# Patient Record
Sex: FEMALE | Race: BLACK OR AFRICAN AMERICAN | Employment: UNEMPLOYED | URBAN - METROPOLITAN AREA
[De-identification: names, ages, dates, MRNs, and addresses within clinical notes are randomized per-mention and may not be internally consistent; named-entity substitution may affect disease eponyms.]

---

## 2017-06-19 LAB
ANTIBODY SCREEN, EXTERNAL: NEGATIVE
HBSAG, EXTERNAL: NEGATIVE
HIV, EXTERNAL: NEGATIVE
RPR, EXTERNAL: NON REACTIVE
RUBELLA, EXTERNAL: NORMAL
TYPE, ABO & RH, EXTERNAL: NORMAL

## 2017-12-11 LAB — GRBS, EXTERNAL: POSITIVE

## 2017-12-26 ENCOUNTER — HOSPITAL ENCOUNTER (INPATIENT)
Age: 27
LOS: 2 days | Discharge: HOME | DRG: 560 | End: 2017-12-28
Attending: OBSTETRICS & GYNECOLOGY | Admitting: OBSTETRICS & GYNECOLOGY
Payer: MEDICAID

## 2017-12-26 ENCOUNTER — ANESTHESIA (OUTPATIENT)
Dept: LABOR AND DELIVERY | Age: 27
DRG: 560 | End: 2017-12-26
Payer: MEDICAID

## 2017-12-26 ENCOUNTER — ANESTHESIA EVENT (OUTPATIENT)
Dept: LABOR AND DELIVERY | Age: 27
DRG: 560 | End: 2017-12-26
Payer: MEDICAID

## 2017-12-26 DIAGNOSIS — Z37.9 NORMAL LABOR: Primary | ICD-10-CM

## 2017-12-26 LAB
BASOPHILS # BLD: 0 K/UL (ref 0–0.1)
BASOPHILS NFR BLD: 0 % (ref 0–1)
DIFFERENTIAL METHOD BLD: ABNORMAL
EOSINOPHIL # BLD: 0.1 K/UL (ref 0–0.4)
EOSINOPHIL NFR BLD: 1 % (ref 0–7)
ERYTHROCYTE [DISTWIDTH] IN BLOOD BY AUTOMATED COUNT: 14.3 % (ref 11.5–14.5)
HCT VFR BLD AUTO: 34.9 % (ref 35–47)
HGB BLD-MCNC: 11.5 G/DL (ref 11.5–16)
LYMPHOCYTES # BLD: 1.2 K/UL (ref 0.8–3.5)
LYMPHOCYTES NFR BLD: 15 % (ref 12–49)
MCH RBC QN AUTO: 28.8 PG (ref 26–34)
MCHC RBC AUTO-ENTMCNC: 33 G/DL (ref 30–36.5)
MCV RBC AUTO: 87.3 FL (ref 80–99)
MONOCYTES # BLD: 0.7 K/UL (ref 0–1)
MONOCYTES NFR BLD: 8 % (ref 5–13)
NEUTS SEG # BLD: 6.3 K/UL (ref 1.8–8)
NEUTS SEG NFR BLD: 76 % (ref 32–75)
PLATELET # BLD AUTO: 304 K/UL (ref 150–400)
RBC # BLD AUTO: 4 M/UL (ref 3.8–5.2)
RBC MORPH BLD: ABNORMAL
WBC # BLD AUTO: 8.3 K/UL (ref 3.6–11)
WBC MORPH BLD: ABNORMAL

## 2017-12-26 PROCEDURE — 0HQ9XZZ REPAIR PERINEUM SKIN, EXTERNAL APPROACH: ICD-10-PCS | Performed by: OBSTETRICS & GYNECOLOGY

## 2017-12-26 PROCEDURE — 65410000002 HC RM PRIVATE OB

## 2017-12-26 PROCEDURE — 85025 COMPLETE CBC W/AUTO DIFF WBC: CPT | Performed by: OBSTETRICS & GYNECOLOGY

## 2017-12-26 PROCEDURE — 74011000250 HC RX REV CODE- 250: Performed by: ANESTHESIOLOGY

## 2017-12-26 PROCEDURE — 77030007880 HC KT SPN EPDRL BBMI -B

## 2017-12-26 PROCEDURE — 74011250636 HC RX REV CODE- 250/636: Performed by: OBSTETRICS & GYNECOLOGY

## 2017-12-26 PROCEDURE — 10907ZC DRAINAGE OF AMNIOTIC FLUID, THERAPEUTIC FROM PRODUCTS OF CONCEPTION, VIA NATURAL OR ARTIFICIAL OPENING: ICD-10-PCS | Performed by: OBSTETRICS & GYNECOLOGY

## 2017-12-26 PROCEDURE — 76060000078 HC EPIDURAL ANESTHESIA

## 2017-12-26 PROCEDURE — 77030031139 HC SUT VCRL2 J&J -A

## 2017-12-26 PROCEDURE — 74011000258 HC RX REV CODE- 258: Performed by: OBSTETRICS & GYNECOLOGY

## 2017-12-26 PROCEDURE — 00HU33Z INSERTION OF INFUSION DEVICE INTO SPINAL CANAL, PERCUTANEOUS APPROACH: ICD-10-PCS | Performed by: ANESTHESIOLOGY

## 2017-12-26 PROCEDURE — 77030014125 HC TY EPDRL BBMI -B: Performed by: ANESTHESIOLOGY

## 2017-12-26 PROCEDURE — 74011000250 HC RX REV CODE- 250

## 2017-12-26 PROCEDURE — 36415 COLL VENOUS BLD VENIPUNCTURE: CPT | Performed by: OBSTETRICS & GYNECOLOGY

## 2017-12-26 PROCEDURE — 75410000002 HC LABOR FEE PER 1 HR

## 2017-12-26 PROCEDURE — 74011250636 HC RX REV CODE- 250/636: Performed by: ANESTHESIOLOGY

## 2017-12-26 PROCEDURE — 74011250636 HC RX REV CODE- 250/636

## 2017-12-26 PROCEDURE — 75410000003 HC RECOV DEL/VAG/CSECN EA 0.5 HR

## 2017-12-26 PROCEDURE — A4300 CATH IMPL VASC ACCESS PORTAL: HCPCS

## 2017-12-26 PROCEDURE — 75410000000 HC DELIVERY VAGINAL/SINGLE

## 2017-12-26 RX ORDER — NALOXONE HYDROCHLORIDE 0.4 MG/ML
0.4 INJECTION, SOLUTION INTRAMUSCULAR; INTRAVENOUS; SUBCUTANEOUS AS NEEDED
Status: DISCONTINUED | OUTPATIENT
Start: 2017-12-26 | End: 2017-12-26 | Stop reason: HOSPADM

## 2017-12-26 RX ORDER — LIDOCAINE HYDROCHLORIDE 10 MG/ML
10 INJECTION INFILTRATION; PERINEURAL ONCE
Status: DISCONTINUED | OUTPATIENT
Start: 2017-12-26 | End: 2017-12-26 | Stop reason: HOSPADM

## 2017-12-26 RX ORDER — BUPIVACAINE HYDROCHLORIDE 5 MG/ML
30 INJECTION, SOLUTION EPIDURAL; INTRACAUDAL AS NEEDED
Status: DISCONTINUED | OUTPATIENT
Start: 2017-12-26 | End: 2017-12-26 | Stop reason: HOSPADM

## 2017-12-26 RX ORDER — FENTANYL CITRATE 50 UG/ML
100 INJECTION, SOLUTION INTRAMUSCULAR; INTRAVENOUS ONCE
Status: COMPLETED | OUTPATIENT
Start: 2017-12-26 | End: 2017-12-26

## 2017-12-26 RX ORDER — SODIUM CHLORIDE 0.9 % (FLUSH) 0.9 %
5-10 SYRINGE (ML) INJECTION AS NEEDED
Status: DISCONTINUED | OUTPATIENT
Start: 2017-12-26 | End: 2017-12-26 | Stop reason: HOSPADM

## 2017-12-26 RX ORDER — SODIUM CHLORIDE 900 MG/100ML
INJECTION INTRAVENOUS
Status: DISCONTINUED
Start: 2017-12-26 | End: 2017-12-27

## 2017-12-26 RX ORDER — BUPIVACAINE HYDROCHLORIDE 2.5 MG/ML
INJECTION, SOLUTION EPIDURAL; INFILTRATION; INTRACAUDAL AS NEEDED
Status: DISCONTINUED | OUTPATIENT
Start: 2017-12-26 | End: 2017-12-26 | Stop reason: HOSPADM

## 2017-12-26 RX ORDER — FENTANYL/BUPIVACAINE/NS/PF 2-1250MCG
1-16 PREFILLED PUMP RESERVOIR EPIDURAL CONTINUOUS
Status: DISCONTINUED | OUTPATIENT
Start: 2017-12-26 | End: 2017-12-27

## 2017-12-26 RX ORDER — ONDANSETRON 2 MG/ML
4 INJECTION INTRAMUSCULAR; INTRAVENOUS
Status: DISCONTINUED | OUTPATIENT
Start: 2017-12-26 | End: 2017-12-26 | Stop reason: HOSPADM

## 2017-12-26 RX ORDER — AMPICILLIN 2 G/1
INJECTION, POWDER, FOR SOLUTION INTRAVENOUS
Status: COMPLETED
Start: 2017-12-26 | End: 2017-12-26

## 2017-12-26 RX ORDER — LIDOCAINE HYDROCHLORIDE AND EPINEPHRINE 15; 5 MG/ML; UG/ML
4.5 INJECTION, SOLUTION EPIDURAL AS NEEDED
Status: DISCONTINUED | OUTPATIENT
Start: 2017-12-26 | End: 2017-12-26 | Stop reason: HOSPADM

## 2017-12-26 RX ORDER — EPHEDRINE SULFATE 50 MG/ML
INJECTION, SOLUTION INTRAVENOUS
Status: DISCONTINUED
Start: 2017-12-26 | End: 2017-12-27

## 2017-12-26 RX ORDER — SODIUM CHLORIDE 0.9 % (FLUSH) 0.9 %
5-10 SYRINGE (ML) INJECTION EVERY 8 HOURS
Status: DISCONTINUED | OUTPATIENT
Start: 2017-12-26 | End: 2017-12-26 | Stop reason: HOSPADM

## 2017-12-26 RX ORDER — SODIUM CHLORIDE, SODIUM LACTATE, POTASSIUM CHLORIDE, CALCIUM CHLORIDE 600; 310; 30; 20 MG/100ML; MG/100ML; MG/100ML; MG/100ML
125 INJECTION, SOLUTION INTRAVENOUS CONTINUOUS
Status: DISCONTINUED | OUTPATIENT
Start: 2017-12-26 | End: 2017-12-26 | Stop reason: HOSPADM

## 2017-12-26 RX ORDER — MAG HYDROX/ALUMINUM HYD/SIMETH 200-200-20
30 SUSPENSION, ORAL (FINAL DOSE FORM) ORAL
Status: DISCONTINUED | OUTPATIENT
Start: 2017-12-26 | End: 2017-12-26 | Stop reason: HOSPADM

## 2017-12-26 RX ORDER — NALBUPHINE HYDROCHLORIDE 10 MG/ML
10 INJECTION, SOLUTION INTRAMUSCULAR; INTRAVENOUS; SUBCUTANEOUS
Status: DISCONTINUED | OUTPATIENT
Start: 2017-12-26 | End: 2017-12-26 | Stop reason: HOSPADM

## 2017-12-26 RX ADMIN — AMPICILLIN SODIUM 2 G: 2 INJECTION, POWDER, FOR SOLUTION INTRAVENOUS at 13:57

## 2017-12-26 RX ADMIN — SODIUM CHLORIDE, SODIUM LACTATE, POTASSIUM CHLORIDE, AND CALCIUM CHLORIDE 125 ML/HR: 600; 310; 30; 20 INJECTION, SOLUTION INTRAVENOUS at 14:01

## 2017-12-26 RX ADMIN — SODIUM CHLORIDE, SODIUM LACTATE, POTASSIUM CHLORIDE, AND CALCIUM CHLORIDE 125 ML/HR: 600; 310; 30; 20 INJECTION, SOLUTION INTRAVENOUS at 19:05

## 2017-12-26 RX ADMIN — LIDOCAINE HYDROCHLORIDE,EPINEPHRINE BITARTRATE 3 ML: 15; .005 INJECTION, SOLUTION EPIDURAL; INFILTRATION; INTRACAUDAL; PERINEURAL at 18:55

## 2017-12-26 RX ADMIN — FENTANYL 0.2 MG/100ML-BUPIV 0.125%-NACL 0.9% EPIDURAL INJ 10 ML/HR: 2/0.125 SOLUTION at 19:08

## 2017-12-26 RX ADMIN — FENTANYL CITRATE 100 MCG: 50 INJECTION, SOLUTION INTRAMUSCULAR; INTRAVENOUS at 18:58

## 2017-12-26 RX ADMIN — AMPICILLIN SODIUM 1 G: 1 INJECTION, POWDER, FOR SOLUTION INTRAMUSCULAR; INTRAVENOUS at 18:20

## 2017-12-26 RX ADMIN — Medication 10 ML: at 23:05

## 2017-12-26 RX ADMIN — BUPIVACAINE HYDROCHLORIDE 10 ML: 2.5 INJECTION, SOLUTION EPIDURAL; INFILTRATION; INTRACAUDAL at 18:58

## 2017-12-26 NOTE — H&P
History & Physical    Name: Claire Pandya MRN: [id number]  SSN: [id number]    YOB: 1990  Age: 32 y.o. Sex: female        Subjective:     Estimated Date of Delivery: 18  OB History      Para Term  AB Living    2     1    SAB TAB Ectopic Molar Multiple Live Births         1          Ms. Westley Mascorro is admitted with pregnancy at 38w3d for active labor. Prenatal course was normal. Please see prenatal records for details. Past Medical History:   Diagnosis Date    Asthma     childhood    Gestational hypertension      History reviewed. No pertinent surgical history. Social History     Occupational History    Not on file. Social History Main Topics    Smoking status: Never Smoker    Smokeless tobacco: Never Used    Alcohol use No    Drug use: No    Sexual activity: Yes     Partners: Male     Family History   Problem Relation Age of Onset    Diabetes Mother     Diabetes Father        No Known Allergies  Prior to Admission medications    Medication Sig Start Date End Date Taking? Authorizing Provider   prenatal vit-calcium-iron-fa (PRENATAL PLUS, CALCIUM CARB,) 27 mg iron- 1 mg tab Take 1 Tab by mouth daily. Indications: pregnancy   Yes Historical Provider        Review of Systems: Pertinent items are noted in HPI. Objective:     Vitals:  Vitals:    17 1259 17 1310   BP: 139/80    Pulse: 96    Resp: 16    Temp: 98.5 °F (36.9 °C)    Weight:  119.7 kg (264 lb)   Height:  5' 9\" (1.753 m)        Physical Exam:  Patient without distress.   Abdomen: soft, nontender  Fundus: soft and non tender  Perineum: blood absent, amniotic fluid absent  Cervical Exam: 4 cm dilated    80% effaced    -1 station  (exam in office)  Presenting Part: cephalic  Lower Extremities:  - Edema No  EFW 7#  Membranes:  Intact  Fetal Heart Rate: pending    Prenatal Labs:   No results found for: RUBELLAEXT, GRBSEXT, HBSAGEXT, HIVEXT, RPREXT, GONNOEXT, CHLAMEXT     Assessment/Plan:     Plan: Admit for labor. .  Group B Strep was positive, will treat prophylactically with ampicillin.     Signed By:  Jayme Condon MD     December 26, 2017

## 2017-12-26 NOTE — PROGRESS NOTES
1255 pt ambulated to unit, in room 4 with early labor. [address] started for GBS+    1700 pt on birthing ball, fetal monitor picking up maternal heart rate intermittently. Asked pt to get into bed to get a good strip on baby. 1750 bolus started for epidural    1825 Dr Augustin Shankar called for epidural placement. 1838 short delay on epidural placement due to emergency in cardiac cath lab. Bedside shift change report given to Sheryl Griffiths RN (oncoming nurse) by Jenise Waddell RN (offgoing nurse). Report included the following information SBAR, Kardex, MAR and Recent Results.

## 2017-12-26 NOTE — IP AVS SNAPSHOT
[address] 
[phone] Patient: Stevie Najera MRN: [id number] FML:6/62/6906 My Medications TAKE these medications as instructed Instructions Each Dose to Equal  
 Morning Noon Evening Bedtime  
 ibuprofen 800 mg tablet Commonly known as:  MOTRIN Your last dose was: Your next dose is: Take 1 Tab by mouth every eight (8) hours as needed for Pain (cramping pain). 800 mg  
    
   
   
   
  
 oxyCODONE-acetaminophen 5-325 mg per tablet Commonly known as:  PERCOCET Your last dose was: Your next dose is: Take 1 Tab by mouth every six (6) hours as needed. Max Daily Amount: 4 Tabs. Indications: Pain, severe pain 1 Tab PRENATAL PLUS (CALCIUM CARB) 27 mg iron- 1 mg Tab Generic drug:  prenatal vit-calcium-iron-fa Your last dose was: Your next dose is: Take 1 Tab by mouth daily. Indications: pregnancy 1 Tab Where to Get Your Medications Information on where to get these meds will be given to you by the nurse or doctor. ! Ask your nurse or doctor about these medications  
  ibuprofen 800 mg tablet  
 oxyCODONE-acetaminophen 5-325 mg per tablet

## 2017-12-26 NOTE — IP AVS SNAPSHOT
[address] [facility] [address] [place] [address] 
[phone] Patient: Cristel Berry MRN: [id number] HXJ:1/92/9651 About your hospitalization You were admitted on:  December 26, 2017 You last received care in the:  [address] [facility] You were discharged on:  December 28, 2017 Why you were hospitalized Your primary diagnosis was:  Not on File Your diagnoses also included:  Normal Labor Things You Need To Do (next 8 weeks) Follow up with Jayme Condon MD  
  
Phone:  [phone] Where:  [address] [facility], [address] [facility], [address] [place] [address] Schedule an appointment with Jayme Condon MD as soon as possible for a visit in 6 week(s)  
routine pp care Phone:  [phone] Where:  [address] [facility], [address] [facility], [address] [place] [address] Discharge Orders None A check isha indicates which time of day the medication should be taken. My Medications TAKE these medications as instructed Instructions Each Dose to Equal  
 Morning Noon Evening Bedtime  
 ibuprofen 800 mg tablet Commonly known as:  MOTRIN Your last dose was: Your next dose is: Take 1 Tab by mouth every eight (8) hours as needed for Pain (cramping pain). 800 mg  
    
   
   
   
  
 oxyCODONE-acetaminophen 5-325 mg per tablet Commonly known as:  PERCOCET Your last dose was: Your next dose is: Take 1 Tab by mouth every six (6) hours as needed. Max Daily Amount: 4 Tabs. Indications: Pain, severe pain 1 Tab PRENATAL PLUS (CALCIUM CARB) 27 mg iron- 1 mg Tab Generic drug:  prenatal vit-calcium-iron-fa Your last dose was: Your next dose is: Take 1 Tab by mouth daily. Indications: pregnancy 1 Tab Where to Get Your Medications Information on where to get these meds will be given to you by the nurse or doctor. ! Ask your nurse or doctor about these medications  
  ibuprofen 800 mg tablet  
 oxyCODONE-acetaminophen 5-325 mg per tablet Discharge Instructions After Your Delivery (the Postpartum Period): Care Instructions Your Care Instructions Congratulations on the birth of your baby. Like pregnancy, the  period can be a time of excitement, rosalie, and exhaustion. You may look at your wondrous little baby and feel happy. You may also be overwhelmed by your new sleep hours and new responsibilities. At first, babies often sleep during the days and are awake at night. They do not have a pattern or routine. They may make sudden gasps, jerk themselves awake, or look like they have crossed eyes. These are all normal, and they may even make you smile. In these first weeks after delivery, try to take good care of yourself. It may take 4 to 6 weeks to feel like yourself again, and possibly longer if you had a  birth. You will likely feel very tired for several weeks. Your days will be full of ups and downs, but lots of rosalie as well. Follow-up care is a key part of your treatment and safety. Be sure to make and go to all appointments, and call your doctor if you are having problems. It's also a good idea to know your test results and keep a list of the medicines you take. How can you care for yourself at home? Take care of your body after delivery · Use pads instead of tampons for the bloody flow that may last as long as 2 weeks. · Ease cramps with ibuprofen (Advil, Motrin). · Ease soreness of hemorrhoids and the area between your vagina and rectum with ice compresses or witch hazel pads. · Ease constipation by drinking lots of fluid and eating high-fiber foods. Ask your doctor about over-the-counter stool softeners. · Cleanse yourself with a gentle squeeze of warm water from a bottle instead of wiping with toilet paper. · Take a sitz bath in warm water several times a day. · Wear a good nursing bra. Ease sore and swollen breasts with warm, wet washcloths. · If you are not breastfeeding, use ice rather than heat for breast soreness. · Your period may not start for several months if you are breastfeeding. You may bleed more, and longer at first, than you did before you got pregnant. · Wait until you are healed (about 4 to 6 weeks) before you have sexual intercourse. Your doctor will tell you when it is okay to have sex. · Try not to travel with your baby for 5 or 6 weeks. If you take a long car trip, make frequent stops to walk around and stretch. Avoid exhaustion · Rest every day. Try to nap when your baby naps. · Ask another adult to be with you for a few days after delivery. · Plan for  if you have other children. · Stay flexible so you can eat at odd hours and sleep when you need to. Both you and your baby are making new schedules. · Plan small trips to get out of the house. Change can make you feel less tired. · Ask for help with housework, cooking, and shopping. Remind yourself that your job is to care for your baby. Know about help for postpartum depression · \"Baby blues\" are common for the first 1 to 2 weeks after birth. You may cry or feel sad or irritable for no reason. · Rest whenever you can. Being tired makes it harder to handle your emotions. · Go for walks with your baby. · Talk to your partner, friends, and family about your feelings. · If your symptoms last for more than a few weeks, or if you feel very depressed, ask your doctor for help. · Postpartum depression can be treated. Support groups and counseling can help. Sometimes medicine can also help. Stay healthy · Eat healthy foods so you have more energy, make good breast milk, and lose extra baby pounds. · If you breastfeed, avoid alcohol and drugs. Stay smoke-free. If you quit during pregnancy, congratulations. · Start daily exercise after 4 to 6 weeks, but rest when you feel tired. · Learn exercises to tone your belly. Do Kegel exercises to regain strength in your pelvic muscles. You can do these exercises while you stand or sit. ¨ Squeeze the same muscles you would use to stop your urine. Your belly and thighs should not move. ¨ Hold the squeeze for 3 seconds, and then relax for 3 seconds. ¨ Start with 3 seconds. Then add 1 second each week until you are able to squeeze for 10 seconds. ¨ Repeat the exercise 10 to 15 times for each session. Do three or more sessions each day. · Find a class for new mothers and new babies that has an exercise time. · If you had a  birth, give yourself a bit more time before you exercise, and be careful. When should you call for help? Call 911 anytime you think you may need emergency care. For example, call if: 
? · You passed out (lost consciousness). ?Call your doctor now or seek immediate medical care if: 
? · You have severe vaginal bleeding. This means you are passing blood clots and soaking through a pad each hour for 2 or more hours. ? · You are dizzy or lightheaded, or you feel like you may faint. ? · You have a fever. ? · You have new belly pain, or your pain gets worse. ? Watch closely for changes in your health, and be sure to contact your doctor if: 
? · Your vaginal bleeding seems to be getting heavier. ? · You have new or worse vaginal discharge. ? · You feel sad, anxious, or hopeless for more than a few days. ? · You do not get better as expected. Where can you learn more? Go to http://aileen-georgette.info/. Enter A461 in the search box to learn more about \"After Your Delivery (the Postpartum Period): Care Instructions. \" Current as of: 2017 Content Version: 11.4 © 5501-6260 [employer]. Care instructions adapted under license by [employer] (which disclaims liability or warranty for this information).  If you have questions about a medical condition or this instruction, always ask your healthcare professional. [place] [address] any warranty or liability for your use of this information. Fitfu Announcement We are excited to announce that we are making your provider's discharge notes available to you in Fitfu. You will see these notes when they are completed and signed by the physician that discharged you from your recent hospital stay. If you have any questions or concerns about any information you see in Fitfu, please call the Health Information Department where you were seen or reach out to your Primary Care Provider for more information about your plan of care. Introducing [facility]! [employer] introduces Fitfu patient portal. Now you can access parts of your medical record, email your doctor's office, and request medication refills online. 1. In your internet browser, go to [email or web address] 2. Click on the First Time User? Click Here link in the Sign In box. You will see the New Member Sign Up page. 3. Enter your Fitfu Access Code exactly as it appears below. You will not need to use this code after youve completed the sign-up process. If you do not sign up before the expiration date, you must request a new code. · Fitfu Access Code: [email or web address] Expires: 3/28/2018 12:29 PM 
 
4. Enter the last four digits of your Social Security Number (xxxx) and Date of Birth (mm/dd/yyyy) as indicated and click Submit. You will be taken to the next sign-up page. 5. Create a Fitfu ID. This will be your Fitfu login ID and cannot be changed, so think of one that is secure and easy to remember. 6. Create a Fitfu password. You can change your password at any time. 7. Enter your Password Reset Question and Answer. This can be used at a later time if you forget your password. 8. Enter your e-mail address.  You will receive e-mail notification when new information is available in EasyPaint. 9. Click Sign Up. You can now view and download portions of your medical record. 10. Click the Download Summary menu link to download a portable copy of your medical information. If you have questions, please visit the Frequently Asked Questions section of the EasyPaint website. Remember, EasyPaint is NOT to be used for urgent needs. For medical emergencies, dial 911. Now available from your iPhone and Android! Providers Seen During Your Hospitalization Provider Specialty Primary office phone Christiana Gates MD Obstetrics & Gynecology [phone] Immunizations Administered for This Admission Name Date MMR  Deferred () Tdap  Deferred () Your Primary Care Physician (PCP) Primary Care Physician Office Phone Office Fax Delroy Counter [phone] You are allergic to the following No active allergies Recent Documentation Height Weight Breastfeeding? BMI OB Status Smoking Status 1.753 m 119.7 kg Unknown 38.99 kg/m2 Recent pregnancy Never Smoker Emergency Contacts Name Discharge Info Relation Home Work Mobile Thornaragabrielle Degroot CAREGIVER [3] Friend [5] [phone] Patient Belongings The following personal items are in your possession at time of discharge: 
  Dental Appliances: None  Visual Aid: None      Home Medications: None   Jewelry: Earrings, Ring  Clothing: Footwear, Shirt, Slippers, Jacket/Coat, Pants, At bedside    Other Valuables: Irlanda, At bedside, [address] [place] [address] Items Sent to Safe: none Please provide this summary of care documentation to your next provider. Signatures-by signing, you are acknowledging that this After Visit Summary has been reviewed with you and you have received a copy. Patient Signature:  ____________________________________________________________ Date:  ____________________________________________________________  
  
Heidy Abner Provider Signature:  ____________________________________________________________ Date:  ____________________________________________________________

## 2017-12-26 NOTE — PROGRESS NOTES
Labor Progress Note  Patient seen, fetal heart rate and contraction pattern evaluated, patient examined. Getting more uncomfortable. Thinking about epidural.  2nd dose of Amp due at 1800  No data found. Physical Exam:  Cervical Exam:  5+ cm dilated    90% effaced    0 station    Presenting Part: cephalic  Uterine Activity: Frequency: Every 2-4 minutes  Fetal Heart Rate: Variability: moderate  Accelerations: yes  Decelerations: none    Assessment/Plan:  Reassuring fetal status, Continue plan for vaginal delivery, epidural now. Continue Amp for GBS.   Likely AROM with next check    Swati Ibarra MD

## 2017-12-27 PROCEDURE — 65410000002 HC RM PRIVATE OB

## 2017-12-27 PROCEDURE — 74011250637 HC RX REV CODE- 250/637: Performed by: OBSTETRICS & GYNECOLOGY

## 2017-12-27 RX ORDER — IBUPROFEN 400 MG/1
800 TABLET ORAL EVERY 8 HOURS
Status: DISCONTINUED | OUTPATIENT
Start: 2017-12-27 | End: 2017-12-28 | Stop reason: HOSPADM

## 2017-12-27 RX ORDER — SIMETHICONE 80 MG
80 TABLET,CHEWABLE ORAL
Status: DISCONTINUED | OUTPATIENT
Start: 2017-12-27 | End: 2017-12-28 | Stop reason: HOSPADM

## 2017-12-27 RX ORDER — ACETAMINOPHEN 325 MG/1
650 TABLET ORAL
Status: DISCONTINUED | OUTPATIENT
Start: 2017-12-27 | End: 2017-12-28 | Stop reason: HOSPADM

## 2017-12-27 RX ORDER — ONDANSETRON 2 MG/ML
4 INJECTION INTRAMUSCULAR; INTRAVENOUS
Status: DISCONTINUED | OUTPATIENT
Start: 2017-12-27 | End: 2017-12-28 | Stop reason: HOSPADM

## 2017-12-27 RX ORDER — NALOXONE HYDROCHLORIDE 0.4 MG/ML
0.4 INJECTION, SOLUTION INTRAMUSCULAR; INTRAVENOUS; SUBCUTANEOUS AS NEEDED
Status: DISCONTINUED | OUTPATIENT
Start: 2017-12-27 | End: 2017-12-28 | Stop reason: HOSPADM

## 2017-12-27 RX ORDER — ADHESIVE BANDAGE
30 BANDAGE TOPICAL DAILY PRN
Status: DISCONTINUED | OUTPATIENT
Start: 2017-12-27 | End: 2017-12-28 | Stop reason: HOSPADM

## 2017-12-27 RX ORDER — HYDROCORTISONE ACETATE PRAMOXINE HCL 2.5; 1 G/100G; G/100G
CREAM TOPICAL AS NEEDED
Status: DISCONTINUED | OUTPATIENT
Start: 2017-12-27 | End: 2017-12-28 | Stop reason: HOSPADM

## 2017-12-27 RX ORDER — DIPHENHYDRAMINE HCL 25 MG
25 CAPSULE ORAL
Status: DISCONTINUED | OUTPATIENT
Start: 2017-12-27 | End: 2017-12-28 | Stop reason: HOSPADM

## 2017-12-27 RX ORDER — ZOLPIDEM TARTRATE 5 MG/1
5 TABLET ORAL
Status: DISCONTINUED | OUTPATIENT
Start: 2017-12-27 | End: 2017-12-28 | Stop reason: HOSPADM

## 2017-12-27 RX ORDER — OXYCODONE AND ACETAMINOPHEN 5; 325 MG/1; MG/1
1 TABLET ORAL
Status: DISCONTINUED | OUTPATIENT
Start: 2017-12-27 | End: 2017-12-28 | Stop reason: HOSPADM

## 2017-12-27 RX ORDER — OXYTOCIN/RINGER'S LACTATE 20/1000 ML
125-500 PLASTIC BAG, INJECTION (ML) INTRAVENOUS ONCE
Status: ACTIVE | OUTPATIENT
Start: 2017-12-27 | End: 2017-12-27

## 2017-12-27 RX ADMIN — IBUPROFEN 800 MG: 400 TABLET, FILM COATED ORAL at 01:32

## 2017-12-27 RX ADMIN — IBUPROFEN 800 MG: 400 TABLET, FILM COATED ORAL at 10:22

## 2017-12-27 RX ADMIN — IBUPROFEN 800 MG: 400 TABLET, FILM COATED ORAL at 18:55

## 2017-12-27 NOTE — LACTATION NOTE
This note was copied from a baby's chart. Assisted mom with getting infant latched. Deep latch obtained, infant sucking rhythmically. Infant appears to have a short frenulum, but at this point is latching on deeply. Breasts may become engorged when milk \"comes in\". How milk is made / normal phases of milk production, supply and demand discussed. Taught care of engorged breasts - frequent breastfeeding encouraged, warm compresses and breast massage ac. Then nurse the baby or pump. Apply cold compresses pc x 15 minutes a few times a day for swelling or discomfort. May need to do this care for a couple of days. Mom will continue to put infant to the breast in response to feeding cues, 8-12 times per day. Discussed breast massage and positioning as well as the importance of a deep latch.

## 2017-12-27 NOTE — ANESTHESIA PREPROCEDURE EVALUATION
Anesthetic History   No history of anesthetic complications            Review of Systems / Medical History  Patient summary reviewed, nursing notes reviewed and pertinent labs reviewed    Pulmonary  Within defined limits                 Neuro/Psych   Within defined limits           Cardiovascular    Hypertension                   GI/Hepatic/Renal  Within defined limits              Endo/Other        Obesity     Other Findings              Physical Exam    Airway  Mallampati: II  TM Distance: > 6 cm  Neck ROM: normal range of motion   Mouth opening: Normal     Cardiovascular  Regular rate and rhythm,  S1 and S2 normal,  no murmur, click, rub, or gallop             Dental  No notable dental hx       Pulmonary  Breath sounds clear to auscultation               Abdominal  GI exam deferred       Other Findings            Anesthetic Plan    ASA: 2  Anesthesia type: epidural          Induction: Intravenous  Anesthetic plan and risks discussed with: Patient

## 2017-12-27 NOTE — PROGRESS NOTES
TRANSFER - IN REPORT:    Verbal report received from KENNETH Fitzpatrick RN(name) on Chelsea Steff  being received from L&D(unit) for routine progression of care      Report consisted of patients Situation, Background, Assessment and   Recommendations(SBAR). Information from the following report(s) SBAR was reviewed with the receiving nurse. Opportunity for questions and clarification was provided. Assessment completed upon patients arrival to unit and care assumed. 2345: Pt assisted OOB to restroom, void complete, and pericare received. Check void due 6245.

## 2017-12-27 NOTE — PROGRESS NOTES
Labor Progress Note  Patient seen, fetal heart rate and contraction pattern evaluated, patient examined. Got epidural, now feeling a lot of pressure  Possible SROM earlier?   Patient Vitals for the past 2 hrs:   BP Temp Pulse Resp SpO2   12/26/17 1952 140/72 98.5 °F (36.9 °C) (!) 104 16 100 %   12/26/17 1930 145/78 - 87 - -   12/26/17 1915 154/82 - (!) 103 - -   12/26/17 1913 146/81 - (!) 101 - -   12/26/17 1912 - - - - 100 %   12/26/17 1911 145/82 - 100 - -   12/26/17 1909 145/85 - (!) 107 - -   12/26/17 1907 153/88 - (!) 105 - 100 %   12/26/17 1905 148/90 - 100 - -   12/26/17 1903 (!) 154/91 - 99 - -   12/26/17 1902 - - - - 100 %   12/26/17 1901 145/89 - (!) 107 - 91 %   12/26/17 1859 147/87 - 90 - -       Physical Exam:  Cervical Exam:  6 cm dilated    100% effaced    +1 to +2 station    Small forebag- AROM-clear  Uterine Activity: Frequency: Every 2-3 minutes  Fetal Heart Rate: Variability: moderate  Accelerations: yes  Decelerations: none    Assessment/Plan:  Reassuring fetal status, Continue plan for vaginal delivery, Continue Amp for GBS    Hernan Morris MD

## 2017-12-27 NOTE — LACTATION NOTE
This note was copied from a baby's chart.     Couplet Interdisciplinary Rounds     MATERNAL    Daily Goal:  Vaginal Delivery Pathway Goals    Influenza screening completed: YES   Tdap screening completed: YES   Rhogam Given:N/A  MMR Given:N/A    VTE Prophylaxis: Not indicated, per Provider order    EPDS:            Patient Name: Licha Summers Diagnosis:   Single liveborn, born in hospital, delivered by vaginal delivery   Date of Admission: 2017 LOS: 1  Gestational Age: Gestational Age: 36w4d       Daily Goal:  [place] Pathway Goals    Birth Weight: 4.145 kg Current Weight: Weight: 4.145 kg (Filed from Delivery Summary)  % of Weight Change: 0%    Feeding: Breastfeeding  [place] Metabolic Screen: NO    Hepatitis B:  NO    Discharge Bili:  N/A  Car Seat Trial, if needed:  N/A      Patient/Family Teaching Needs:  & PP care, discharge planning    Days before discharge: One day until discharge    In Attendance:  Nursing and Physician

## 2017-12-27 NOTE — PROGRESS NOTES
1945: Bedside and Verbal shift change report given to JUAN CARLOS Sanchez RN (oncoming nurse) by BONNIE Segovia RN (offgoing nurse). Report included the following information SBAR, Kardex, Procedure Summary, Intake/Output, MAR, Accordion, Recent Results and Med Rec Status. 2030: Bedside and Verbal shift change report given to MORIS Fitzpatrick RN (oncoming nurse) by JUAN CARLOS Sanchez RN (offgoing nurse). Report included the following information SBAR, Kardex, Procedure Summary, Intake/Output, MAR, Accordion, Recent Results and Med Rec Status.

## 2017-12-27 NOTE — PROGRESS NOTES
Progress Note                               Patient: Paco Orozco MRN: [id number]  SSN: [id number]    YOB: 1990  Age: 32 y.o. Sex: female      Postpartum Day Number 1    Subjective:     Patient doing well postpartum without significant complaints. Voiding without difficulty. Patient reports normal lochia. . Breastfeeding: Yes     Objective:     Patient Vitals for the past 18 hrs:   Temp Pulse Resp BP SpO2   17 0658 98.2 °F (36.8 °C) 97 16 117/74 -   17 0050 98.5 °F (36.9 °C) 81 18 146/82 -   17 2340 98.5 °F (36.9 °C) 83 18 143/88 -   17 223 - 90 18 161/67 -   17 - 87 18 149/70 -   17 220 - 88 18 148/69 -   17 2150 - 87 18 159/79 -   17 2135 - 99 18 157/77 -   17 2120 98.7 °F (37.1 °C) (!) 101 20 153/74 -   17 - (!) 101 20 153/78 -   17 98.5 °F (36.9 °C) (!) 104 16 140/72 100 %   17 1930 - 87 - 145/78 -   17 - (!) 103 - 154/82 -   17 - (!) 101 - 146/81 -   17 - - - - 100 %   17 - 100 - 145/82 -   17 - (!) 107 - 145/85 -   17 - (!) 105 - 153/88 100 %   17 - 100 - 148/90 -   17 - 99 - (!) 154/91 -   17 - - - - 100 %   17 - (!) 107 - 145/89 91 %   17 1859 - 90 - 147/87 -   17 1559 - 86 - 136/74 -   17 1402 - 89 - 134/76 -        Temp (24hrs), Av.5 °F (36.9 °C), Min:98.2 °F (36.8 °C), Max:98.7 °F (37.1 °C)      Physical Exam:    General:   Patient without distress. Abdomen: Soft, fundus firm at level of umbilicus, nontender   Lower Extremities: Negative for swelling, cords, or tenderness. Lab/Data Review:  CBC:    Recent Labs      17   1326   WBC  8.3   HGB  11.5   HCT  34.9*   PLT  304       Assessment and Plan:     PPD1 vaginal delivery complicated by shoulder dystocia. Patient appears to be having an uncomplicated postpartum course.  Continue routine perineal care and maternal education. Boy- desires circ however he has not urinated, consent in chart  [place], [place]    Signed By: Keo Vuong.  Nadeem Tristan MD     December 27, 2017

## 2017-12-27 NOTE — ANESTHESIA PROCEDURE NOTES
Epidural Block    Performed by: Dayanna Valenzuela  Authorized by: Dayanna Valenzuela     Pre-Procedure  Indication: labor epidural    Preanesthetic Checklist: risks and benefits discussed and timeout performed      Epidural:   Patient position:  Seated  Prep region:  Lumbar  Prep: Chlorhexidine    Location:  L2-3    Needle and Epidural Catheter:   Needle Type:  Tuohy  Needle Gauge:  17 G  Injection Technique:  Loss of resistance using air  Attempts:  1  Catheter Size:  19 G  Events: no blood with aspiration, no cerebrospinal fluid with aspiration, no paresthesia and negative aspiration test    Test Dose:  Bupivacaine 0.25% and negative    Assessment:   Catheter Secured:  Tegaderm and tape  Insertion:  Uncomplicated  Patient tolerance:  Patient tolerated the procedure well with no immediate complications

## 2017-12-27 NOTE — LACTATION NOTE
This note was copied from a baby's chart. Initial Lactation Consultation - Baby born vaginally yesterday to a  mom at 45 3/7 weeks gestation. Mom says she attempted to nurse her 5year old daughter but couldn't get her to latch. This baby has latched and nursed. I helped mom get the baby latched in the football hold. Baby was able to get a deep latch and began sucking rhythmically with several audible swallows. Feeding Plan: Mother will keep baby skin to skin as often as possible, feed on demand, respond to feeding cues, obtain latch, listen for audible swallowing, be aware of signs of oxytocin release/ cramping,thrist,sleepyness while breastfeeding, offer both breasts,and will not limit feedings.

## 2017-12-27 NOTE — PROGRESS NOTES
2030-Bedside report received from JUAN CARLOS Summers. Client feeling a strong desire to push, client encouraged to push her PCA button to her epidural pump. 2040-Zainab Enriquez informed of client's urge to push, will perform SVE and update her on client's status. 2042-SVE performed, Dr. Carlie Dimas called to bedside f or delivery   2043-Dr. Cameron at bedside for delivery. 2326-TRANSFER - OUT REPORT:    Verbal report given to FRANK Lopez RN (name) on Jesse Cedeno  being transferred to [facility](unit) for routine progression of care       Report consisted of patients Situation, Background, Assessment and   Recommendations(SBAR). Information from the following report(s) SBAR, Kardex, Intake/Output, MAR and Accordion was reviewed with the receiving nurse. Lines:   Saline Lock 12/26/17 Right Arm (Active)        Opportunity for questions and clarification was provided.       Patient transported with:   Registered Nurse

## 2017-12-27 NOTE — L&D DELIVERY NOTE
Delivery Summary    Patient: Trevin Rogers MRN: [id number]  SSN: [id number]    YOB: 1990  Age: 32 y.o. Sex: female        Labor Events:    Labor: No    Rupture Date: 2017    Rupture Time: 8:20 PM    Rupture Type AROM    Amniotic Fluid Volume:      Amniotic Fluid Description: Clear       Induction: None        Augmentation: None    Labor Complications: None     Additional Complications:        Cervical Ripening:         none     Delivery Events:  Episiotomy: None    Laceration(s): First degree perineal;Left labial;Right labial      Repaired: Yes     Number of Repair Packets: 2   Suture Type and Size:  3-0 Vicryl       Estimated Blood Loss (ml):   300cc       Information for the patient's :  Pillo Rivera [[id number]]     Delivery Summary - Baby    Delivery Date: 2017   Delivery Time: 8:54 PM   Delivery Type: Vaginal, Spontaneous Delivery  Sex:  male  Gestational Age: 36w4d  Delivery Clinician: Nicole Parham  Living?: Living   Delivery Location: L&D room 4           APGARS  One minute Five minutes Ten minutes   Skin Color: 1    1       Heart Rate: 2   2         Reflex Irritability: 1   2         Muscle Tone: 2   2       Respiration: 2   2         Total: 8   9           Presentation: Vertex  Position:   Occiput Anterior  Resuscitation Method:  Suctioning-bulb; Tactile Stimulation     Meconium Stained: None    Cord Information:     Complications: None  Cord Blood Sent?:  No    Blood Gases Sent?:  No    Placenta:  Date/Time:   8:58 PM  Removal: Spontaneous      Appearance: Normal     [place] Measurements:  Birth Weight:      Birth Length:     Head Circumference:       Chest Circumference:      Abdominal Girth:       Other Providers:   EMILY ROLDAN;LESTER MORA;STEPHANY [address] [place] [address] Obstetrician;Primary Nurse;Primary  Nurse           Cord Blood Results:  Information for the patient's :  Pillo Nicole [[id number]]   No results found for: [address], PCTABR, PCTDIG, BILI, ABORHEXT, ABORH    Information for the patient's :  Anaya Key [[id number]]   No results found for: APH, APCO2, APO2, AHCO3, ABEC, ABDC, O2ST, SITE, RSCOM, PHI, PCO2I, PO2I, HCO3I, SO2I, IBD     Information for the patient's :  Anaya Key [[id number]]   No results found for: EPHV, PCO2V, PO2V, HCO3V, O2STV, EBDV   and   Information for the patient's :  Anaya Key [[id number]]   Shoulder Dystocia Details:       Affected Side:  Right shoulder was anterior      Date and Time Recognized: 2017  8:53 PM      Help Called Parrish Paris at 2017  8:53 PM      Physician/Provider: Cheryle Baron arrived at 2017  8:53 PM      NICU Staff: Additional Staff Arrived:   at 2017  8:54 PM      Gentle Attempt at Traction: yes          First Maneuver: Ginny maneuver  suprapubic pressure  delivery of posterior arm at 2017  8:53 PM        Turtling of head was noted and stepstool readied, bed positioned in anticipation of possible shoulder dystocia. After delivery of head, one attempt was made to deliver shoulders with gentle steady downward traction. Pt then positioned in Ginny and suprapubic pressure simultaneously applied- a 2nd attempt was made with gentle steady downward traction- again unsuccessful. The position of the posterior (left) arm and shoulder was palpated and there was ample room noted posteriorly. The 's finger easily slid under the left axilla to deliver the posterior shoulder. After this, the anterior (right) shoulder was easily delivered and the remainder of the body followed. The infant was vigorous upon delivery and appeared to be moving both arms equally with good flexion of both arms. The situation and necessary maneuvers were explained to the pt after delivery.

## 2017-12-28 VITALS
HEART RATE: 70 BPM | TEMPERATURE: 98.5 F | DIASTOLIC BLOOD PRESSURE: 78 MMHG | WEIGHT: 264 LBS | HEIGHT: 69 IN | RESPIRATION RATE: 16 BRPM | SYSTOLIC BLOOD PRESSURE: 138 MMHG | BODY MASS INDEX: 39.1 KG/M2 | OXYGEN SATURATION: 100 %

## 2017-12-28 PROCEDURE — 74011250637 HC RX REV CODE- 250/637: Performed by: OBSTETRICS & GYNECOLOGY

## 2017-12-28 RX ORDER — OXYCODONE AND ACETAMINOPHEN 5; 325 MG/1; MG/1
1 TABLET ORAL
Qty: 20 TAB | Refills: 0 | Status: SHIPPED | OUTPATIENT
Start: 2017-12-28 | End: 2019-01-02

## 2017-12-28 RX ORDER — IBUPROFEN 800 MG/1
800 TABLET ORAL
Qty: 30 TAB | Refills: 1 | Status: SHIPPED | OUTPATIENT
Start: 2017-12-28 | End: 2019-01-02

## 2017-12-28 RX ADMIN — IBUPROFEN 800 MG: 400 TABLET, FILM COATED ORAL at 07:17

## 2017-12-28 NOTE — ROUTINE PROCESS
Preceptor Review of MICH Leon RN Work      Shift times - 0800 to 2000    The documentation of patient care has been reviewed and approved. All medications have been administered under the direct supervision of the preceptor.     Dotty Lazar RN

## 2017-12-28 NOTE — DISCHARGE SUMMARY
Obstetrical Discharge Summary     Name: Avelina Tao MRN: [id number]  SSN: [id number]    YOB: 1990  Age: 32 y.o. Sex: female      Admit Date: 2017    Discharge Date: 2017     Admitting Physician: Zeke Longoria MD     Attending Physician: Zeke Longoria MD     * Admission Diagnoses: MATERNITY  Normal labor    * Discharge Diagnoses:   Information for the patient's :  Carlene Levine [[id number]]   Delivery of a 4.145 kg male infant via Vaginal, Spontaneous Delivery on 2017 at 8:54 PM  by . Apgars were 8 and 9. Additional Diagnoses:   Hospital Problems as of 2017  Never Reviewed          Codes Class Noted - Resolved POA    Normal labor ICD-10-CM: O80, Z37.9  ICD-9-CM: 584  2017 - Present Unknown             Lab Results   Component Value Date/Time    Rubella, External immune 2017    GrBStrep, External positive  2017    ABO,Rh A+ 2017    There is no immunization history for the selected administration types on file for this patient. * Procedures:          * Discharge Condition: [place] [facility] Course: Normal hospital course following the delivery. * Disposition: Home    Discharge Medications:   Current Discharge Medication List      START taking these medications    Details   ibuprofen (MOTRIN) 800 mg tablet Take 1 Tab by mouth every eight (8) hours as needed for Pain (cramping pain). Qty: 30 Tab, Refills: 1    Associated Diagnoses: Normal labor      oxyCODONE-acetaminophen (PERCOCET) 5-325 mg per tablet Take 1 Tab by mouth every six (6) hours as needed. Max Daily Amount: 4 Tabs. Indications: Pain, severe pain  Qty: 20 Tab, Refills: 0    Associated Diagnoses: Normal labor         CONTINUE these medications which have NOT CHANGED    Details   prenatal vit-calcium-iron-fa (PRENATAL PLUS, CALCIUM CARB,) 27 mg iron- 1 mg tab Take 1 Tab by mouth daily.  Indications: pregnancy             * Follow-up Care/Patient Instructions:   Activity: Activity as tolerated and No sex for 6 weeks  Diet: Regular Diet  Wound Care: None needed    Follow-up Information     Follow up With Details Comments Ignacio Wells MD   [facility] [address]  [phone]      Zeke Longoria MD In 6 weeks routine pp care [facility] [address]  [phone]             Signed By:  Manuel Meehan DO     December 28, 2017

## 2017-12-28 NOTE — LACTATION NOTE
This note was copied from a baby's chart. Mom and baby scheduled for discharge today. Baby nursing well and has improved throughout post partum stay, deep latch maintained, mother is comfortable, milk is in transition, baby feeding vigorously with rhythmic suck, swallow, breathe pattern, with audible swallowing, and evident milk transfer, both breasts offerd, baby is asleep following feeding. Baby is feeding on demand, voiding and stools present as appropriate over the last 24 hours. We reviewed cluster feeding. Frequent feeding during the brief behavioral phase preceeding milk transition is called cluster feeding. Typical  behavior: baby becomes vigorous at the breast and wants to feed frequently- every 1-2 hours for several feedings. Emptying of the breast twice produces double in subsequent feedings. This is the normal process by which the baby demands his/her supply. This type of frequent feeding is the stimulation which causes lactogenesis II (milk coming in). We discussed engorgement. Breasts may become engorged when milk \"comes in\". How milk is made / normal phases of milk production, supply and demand discussed. Taught care of engorged breasts - frequent breastfeeding encouraged, warm compresses and breast massage ac. Then nurse the baby or pump. Apply cold compresses pc x 15 minutes a few times a day for swelling or discomfort. May need to do this care for a couple of days. Teaching completed and all questions answered. Feeding log updated and given to mom.

## 2017-12-28 NOTE — DISCHARGE INSTRUCTIONS
After Your Delivery (the Postpartum Period): Care Instructions  Your Care Instructions    Congratulations on the birth of your baby. Like pregnancy, the  period can be a time of excitement, rosalie, and exhaustion. You may look at your wondrous little baby and feel happy. You may also be overwhelmed by your new sleep hours and new responsibilities. At first, babies often sleep during the days and are awake at night. They do not have a pattern or routine. They may make sudden gasps, jerk themselves awake, or look like they have crossed eyes. These are all normal, and they may even make you smile. In these first weeks after delivery, try to take good care of yourself. It may take 4 to 6 weeks to feel like yourself again, and possibly longer if you had a  birth. You will likely feel very tired for several weeks. Your days will be full of ups and downs, but lots of rosalie as well. Follow-up care is a key part of your treatment and safety. Be sure to make and go to all appointments, and call your doctor if you are having problems. It's also a good idea to know your test results and keep a list of the medicines you take. How can you care for yourself at home? Take care of your body after delivery  · Use pads instead of tampons for the bloody flow that may last as long as 2 weeks. · Ease cramps with ibuprofen (Advil, Motrin). · Ease soreness of hemorrhoids and the area between your vagina and rectum with ice compresses or witch hazel pads. · Ease constipation by drinking lots of fluid and eating high-fiber foods. Ask your doctor about over-the-counter stool softeners. · Cleanse yourself with a gentle squeeze of warm water from a bottle instead of wiping with toilet paper. · Take a sitz bath in warm water several times a day. · Wear a good nursing bra. Ease sore and swollen breasts with warm, wet washcloths. · If you are not breastfeeding, use ice rather than heat for breast soreness.   · Your period may not start for several months if you are breastfeeding. You may bleed more, and longer at first, than you did before you got pregnant. · Wait until you are healed (about 4 to 6 weeks) before you have sexual intercourse. Your doctor will tell you when it is okay to have sex. · Try not to travel with your baby for 5 or 6 weeks. If you take a long car trip, make frequent stops to walk around and stretch. Avoid exhaustion  · Rest every day. Try to nap when your baby naps. · Ask another adult to be with you for a few days after delivery. · Plan for  if you have other children. · Stay flexible so you can eat at odd hours and sleep when you need to. Both you and your baby are making new schedules. · Plan small trips to get out of the house. Change can make you feel less tired. · Ask for help with housework, cooking, and shopping. Remind yourself that your job is to care for your baby. Know about help for postpartum depression  · \"Baby blues\" are common for the first 1 to 2 weeks after birth. You may cry or feel sad or irritable for no reason. · Rest whenever you can. Being tired makes it harder to handle your emotions. · Go for walks with your baby. · Talk to your partner, friends, and family about your feelings. · If your symptoms last for more than a few weeks, or if you feel very depressed, ask your doctor for help. · Postpartum depression can be treated. Support groups and counseling can help. Sometimes medicine can also help. Stay healthy  · Eat healthy foods so you have more energy, make good breast milk, and lose extra baby pounds. · If you breastfeed, avoid alcohol and drugs. Stay smoke-free. If you quit during pregnancy, congratulations. · Start daily exercise after 4 to 6 weeks, but rest when you feel tired. · Learn exercises to tone your belly. Do Kegel exercises to regain strength in your pelvic muscles. You can do these exercises while you stand or sit.   ¨ Squeeze the same muscles you would use to stop your urine. Your belly and thighs should not move. ¨ Hold the squeeze for 3 seconds, and then relax for 3 seconds. ¨ Start with 3 seconds. Then add 1 second each week until you are able to squeeze for 10 seconds. ¨ Repeat the exercise 10 to 15 times for each session. Do three or more sessions each day. · Find a class for new mothers and new babies that has an exercise time. · If you had a  birth, give yourself a bit more time before you exercise, and be careful. When should you call for help? Call 911 anytime you think you may need emergency care. For example, call if:  ? · You passed out (lost consciousness). ?Call your doctor now or seek immediate medical care if:  ? · You have severe vaginal bleeding. This means you are passing blood clots and soaking through a pad each hour for 2 or more hours. ? · You are dizzy or lightheaded, or you feel like you may faint. ? · You have a fever. ? · You have new belly pain, or your pain gets worse. ? Watch closely for changes in your health, and be sure to contact your doctor if:  ? · Your vaginal bleeding seems to be getting heavier. ? · You have new or worse vaginal discharge. ? · You feel sad, anxious, or hopeless for more than a few days. ? · You do not get better as expected. Where can you learn more? Go to http://aileen-georgette.info/. Enter A461 in the search box to learn more about \"After Your Delivery (the Postpartum Period): Care Instructions. \"  Current as of: 2017  Content Version: 11.4  © 3400-0904 [employer]. Care instructions adapted under license by [employer] (which disclaims liability or warranty for this information). If you have questions about a medical condition or this instruction, always ask your healthcare professional. [address] any warranty or liability for your use of this information.

## 2017-12-28 NOTE — PROGRESS NOTES
Post-Partum Day Number 2 Progress Note    Patient doing well post-partum without significant complaints. Voiding without difficulty, normal lochia. Feels \"tired. \"  Breastfeeding. Vitals:  Patient Vitals for the past 24 hrs:   BP Temp Pulse Resp   17 0421 128/78 98.2 °F (36.8 °C) 76 16   17 2108 132/80 98.6 °F (37 °C) 75 16   17 1637 119/70 98.3 °F (36.8 °C) 77 14   17 0850 132/70 98.7 °F (37.1 °C) 87 16     Temp (24hrs), Av.5 °F (36.9 °C), Min:98.2 °F (36.8 °C), Max:98.7 °F (37.1 °C)      Vital signs stable, afebrile. Exam:  Patient without distress. Abdomen soft, fundus firm, nontender               Lower extremities- nontender without edema; no cords      Assessment and Plan:  Patient appears to be having uncomplicated post-partum course. Discharge today-instructions reviewed. A+/RI/male infant, for circumcision today.     Signed By: Lennox Altman DO     2017

## 2018-07-16 LAB
ANTIBODY SCREEN, EXTERNAL: NEGATIVE
CHLAMYDIA, EXTERNAL: NEGATIVE
HBSAG, EXTERNAL: NEGATIVE
HIV, EXTERNAL: NON REACTIVE
N. GONORRHEA, EXTERNAL: NEGATIVE
RUBELLA, EXTERNAL: NORMAL
T. PALLIDUM, EXTERNAL: NEGATIVE

## 2018-12-06 LAB — GRBS, EXTERNAL: POSITIVE

## 2018-12-28 ENCOUNTER — HOSPITAL ENCOUNTER (OUTPATIENT)
Dept: OTHER | Age: 28
Discharge: HOME | End: 2018-12-28
Payer: MEDICARE

## 2018-12-28 LAB
ERYTHROCYTE [DISTWIDTH] IN BLOOD BY AUTOMATED COUNT: 14.2 % (ref 11.5–14.5)
HCT VFR BLD AUTO: 35.9 % (ref 35–47)
HGB BLD-MCNC: 11.7 G/DL (ref 11.5–16)
MCH RBC QN AUTO: 28.7 PG (ref 26–34)
MCHC RBC AUTO-ENTMCNC: 32.6 G/DL (ref 30–36.5)
MCV RBC AUTO: 88 FL (ref 80–99)
NRBC # BLD: 0 K/UL (ref 0–0.01)
NRBC BLD-RTO: 0 PER 100 WBC
PLATELET # BLD AUTO: 241 K/UL (ref 150–400)
PMV BLD AUTO: 11.7 FL (ref 8.9–12.9)
RBC # BLD AUTO: 4.08 M/UL (ref 3.8–5.2)
WBC # BLD AUTO: 6.5 K/UL (ref 3.6–11)

## 2018-12-28 PROCEDURE — 36415 COLL VENOUS BLD VENIPUNCTURE: CPT

## 2018-12-28 PROCEDURE — 85027 COMPLETE CBC AUTOMATED: CPT

## 2018-12-28 PROCEDURE — 86900 BLOOD TYPING SEROLOGIC ABO: CPT

## 2018-12-28 NOTE — PROGRESS NOTES
Patient here for Pre-Admission Testing (PAT) for scheduled  section. PAT packet reviewed with the patient. Labs drawn and sent. BALAJI wipes and preventing surgical site infection education given to the patient. Education also provided to be NPO after midnight and to arrive for scheduled procedure at 0600 on 2018. Patient verbalizes understanding and sent home with PAT packet for further review. Patient instructed to take no medication(s) on the morning of her scheduled procedure.

## 2018-12-30 ENCOUNTER — ANESTHESIA (OUTPATIENT)
Dept: LABOR AND DELIVERY | Age: 28
End: 2018-12-30
Payer: MEDICARE

## 2018-12-30 ENCOUNTER — ANESTHESIA EVENT (OUTPATIENT)
Dept: LABOR AND DELIVERY | Age: 28
End: 2018-12-30
Payer: MEDICARE

## 2018-12-30 ENCOUNTER — HOSPITAL ENCOUNTER (INPATIENT)
Age: 28
LOS: 3 days | Discharge: HOME | End: 2019-01-02
Attending: OBSTETRICS & GYNECOLOGY | Admitting: OBSTETRICS & GYNECOLOGY
Payer: MEDICARE

## 2018-12-30 DIAGNOSIS — R52 PAIN: Primary | ICD-10-CM

## 2018-12-30 PROBLEM — O26.893 ABDOMINAL PAIN DURING PREGNANCY IN THIRD TRIMESTER: Status: ACTIVE | Noted: 2018-12-30

## 2018-12-30 PROBLEM — Z34.90 PREGNANCY: Status: ACTIVE | Noted: 2018-12-30

## 2018-12-30 PROBLEM — R10.9 ABDOMINAL PAIN DURING PREGNANCY IN THIRD TRIMESTER: Status: ACTIVE | Noted: 2018-12-30

## 2018-12-30 LAB
ABO + RH BLD: NORMAL
BLOOD GROUP ANTIBODIES SERPL: NORMAL
ERYTHROCYTE [DISTWIDTH] IN BLOOD BY AUTOMATED COUNT: 14.2 % (ref 11.5–14.5)
HCT VFR BLD AUTO: 37.5 % (ref 35–47)
HGB BLD-MCNC: 12.2 G/DL (ref 11.5–16)
MCH RBC QN AUTO: 28.6 PG (ref 26–34)
MCHC RBC AUTO-ENTMCNC: 32.5 G/DL (ref 30–36.5)
MCV RBC AUTO: 88 FL (ref 80–99)
NRBC # BLD: 0 K/UL (ref 0–0.01)
NRBC BLD-RTO: 0 PER 100 WBC
PLATELET # BLD AUTO: 242 K/UL (ref 150–400)
PMV BLD AUTO: 11.7 FL (ref 8.9–12.9)
RBC # BLD AUTO: 4.26 M/UL (ref 3.8–5.2)
SPECIMEN EXP DATE BLD: NORMAL
WBC # BLD AUTO: 7.7 K/UL (ref 3.6–11)

## 2018-12-30 PROCEDURE — 75410000003 HC RECOV DEL/VAG/CSECN EA 0.5 HR: Performed by: OBSTETRICS & GYNECOLOGY

## 2018-12-30 PROCEDURE — 85027 COMPLETE CBC AUTOMATED: CPT

## 2018-12-30 PROCEDURE — 74011250636 HC RX REV CODE- 250/636

## 2018-12-30 PROCEDURE — 76010000391 HC C SECN FIRST 1 HR: Performed by: OBSTETRICS & GYNECOLOGY

## 2018-12-30 PROCEDURE — 74011250636 HC RX REV CODE- 250/636: Performed by: OBSTETRICS & GYNECOLOGY

## 2018-12-30 PROCEDURE — 77030020061 HC IV BLD WRMR ADMIN SET 3M -B: Performed by: ANESTHESIOLOGY

## 2018-12-30 PROCEDURE — 77030014125 HC TY EPDRL BBMI -B: Performed by: ANESTHESIOLOGY

## 2018-12-30 PROCEDURE — 96361 HYDRATE IV INFUSION ADD-ON: CPT

## 2018-12-30 PROCEDURE — 36415 COLL VENOUS BLD VENIPUNCTURE: CPT

## 2018-12-30 PROCEDURE — 76060000078 HC EPIDURAL ANESTHESIA: Performed by: OBSTETRICS & GYNECOLOGY

## 2018-12-30 PROCEDURE — 74011250636 HC RX REV CODE- 250/636: Performed by: ANESTHESIOLOGY

## 2018-12-30 PROCEDURE — 96374 THER/PROPH/DIAG INJ IV PUSH: CPT

## 2018-12-30 PROCEDURE — 99218 HC RM OBSERVATION: CPT

## 2018-12-30 PROCEDURE — 77030034849

## 2018-12-30 PROCEDURE — 76010000392 HC C SECN EA ADDL 0.5 HR: Performed by: OBSTETRICS & GYNECOLOGY

## 2018-12-30 PROCEDURE — 74011000250 HC RX REV CODE- 250

## 2018-12-30 PROCEDURE — 65410000002 HC RM PRIVATE OB

## 2018-12-30 PROCEDURE — 4A0HXCZ MEASUREMENT OF PRODUCTS OF CONCEPTION, CARDIAC RATE, EXTERNAL APPROACH: ICD-10-PCS | Performed by: OBSTETRICS & GYNECOLOGY

## 2018-12-30 RX ORDER — FENTANYL CITRATE 50 UG/ML
100 INJECTION, SOLUTION INTRAMUSCULAR; INTRAVENOUS
Status: DISCONTINUED | OUTPATIENT
Start: 2018-12-30 | End: 2018-12-30

## 2018-12-30 RX ORDER — SODIUM CHLORIDE, SODIUM LACTATE, POTASSIUM CHLORIDE, CALCIUM CHLORIDE 600; 310; 30; 20 MG/100ML; MG/100ML; MG/100ML; MG/100ML
125 INJECTION, SOLUTION INTRAVENOUS CONTINUOUS
Status: DISCONTINUED | OUTPATIENT
Start: 2018-12-30 | End: 2019-01-02 | Stop reason: HOSPADM

## 2018-12-30 RX ORDER — ONDANSETRON 2 MG/ML
INJECTION INTRAMUSCULAR; INTRAVENOUS AS NEEDED
Status: DISCONTINUED | OUTPATIENT
Start: 2018-12-30 | End: 2018-12-30 | Stop reason: HOSPADM

## 2018-12-30 RX ORDER — CEFAZOLIN SODIUM/WATER 2 G/20 ML
2 SYRINGE (ML) INTRAVENOUS ONCE
Status: DISCONTINUED | OUTPATIENT
Start: 2018-12-30 | End: 2018-12-30 | Stop reason: DRUGHIGH

## 2018-12-30 RX ORDER — KETOROLAC TROMETHAMINE 30 MG/ML
INJECTION, SOLUTION INTRAMUSCULAR; INTRAVENOUS AS NEEDED
Status: DISCONTINUED | OUTPATIENT
Start: 2018-12-30 | End: 2018-12-30 | Stop reason: HOSPADM

## 2018-12-30 RX ORDER — SODIUM CHLORIDE, SODIUM LACTATE, POTASSIUM CHLORIDE, CALCIUM CHLORIDE 600; 310; 30; 20 MG/100ML; MG/100ML; MG/100ML; MG/100ML
INJECTION, SOLUTION INTRAVENOUS
Status: DISCONTINUED | OUTPATIENT
Start: 2018-12-30 | End: 2018-12-30 | Stop reason: HOSPADM

## 2018-12-30 RX ORDER — DOCUSATE SODIUM 100 MG/1
100 CAPSULE, LIQUID FILLED ORAL
Status: DISCONTINUED | OUTPATIENT
Start: 2018-12-30 | End: 2019-01-02 | Stop reason: HOSPADM

## 2018-12-30 RX ORDER — AMMONIA 15 % (W/V)
1 AMPUL (EA) INHALATION AS NEEDED
Status: DISCONTINUED | OUTPATIENT
Start: 2018-12-30 | End: 2019-01-02 | Stop reason: HOSPADM

## 2018-12-30 RX ORDER — MORPHINE SULFATE 10 MG/ML
6 INJECTION, SOLUTION INTRAMUSCULAR; INTRAVENOUS
Status: DISCONTINUED | OUTPATIENT
Start: 2018-12-30 | End: 2019-01-02 | Stop reason: HOSPADM

## 2018-12-30 RX ORDER — MORPHINE SULFATE 0.5 MG/ML
INJECTION, SOLUTION EPIDURAL; INTRATHECAL; INTRAVENOUS AS NEEDED
Status: DISCONTINUED | OUTPATIENT
Start: 2018-12-30 | End: 2018-12-30 | Stop reason: HOSPADM

## 2018-12-30 RX ORDER — NALBUPHINE HYDROCHLORIDE 10 MG/ML
10 INJECTION, SOLUTION INTRAMUSCULAR; INTRAVENOUS; SUBCUTANEOUS
Status: DISCONTINUED | OUTPATIENT
Start: 2018-12-30 | End: 2018-12-30 | Stop reason: HOSPADM

## 2018-12-30 RX ORDER — NALBUPHINE HYDROCHLORIDE 10 MG/ML
2.5 INJECTION, SOLUTION INTRAMUSCULAR; INTRAVENOUS; SUBCUTANEOUS
Status: DISCONTINUED | OUTPATIENT
Start: 2018-12-30 | End: 2019-01-02 | Stop reason: HOSPADM

## 2018-12-30 RX ORDER — OXYCODONE AND ACETAMINOPHEN 5; 325 MG/1; MG/1
1 TABLET ORAL
Status: DISCONTINUED | OUTPATIENT
Start: 2018-12-30 | End: 2019-01-02 | Stop reason: HOSPADM

## 2018-12-30 RX ORDER — SIMETHICONE 80 MG
80 TABLET,CHEWABLE ORAL
Status: DISCONTINUED | OUTPATIENT
Start: 2018-12-30 | End: 2019-01-02 | Stop reason: HOSPADM

## 2018-12-30 RX ORDER — NALBUPHINE HYDROCHLORIDE 10 MG/ML
5 INJECTION, SOLUTION INTRAMUSCULAR; INTRAVENOUS; SUBCUTANEOUS
Status: DISCONTINUED | OUTPATIENT
Start: 2018-12-30 | End: 2019-01-02 | Stop reason: HOSPADM

## 2018-12-30 RX ORDER — OXYTOCIN/RINGER'S LACTATE 20/1000 ML
125 PLASTIC BAG, INJECTION (ML) INTRAVENOUS AS NEEDED
Status: DISCONTINUED | OUTPATIENT
Start: 2018-12-30 | End: 2019-01-02 | Stop reason: HOSPADM

## 2018-12-30 RX ORDER — SODIUM CHLORIDE 0.9 % (FLUSH) 0.9 %
5-10 SYRINGE (ML) INJECTION EVERY 8 HOURS
Status: DISCONTINUED | OUTPATIENT
Start: 2018-12-30 | End: 2019-01-02 | Stop reason: HOSPADM

## 2018-12-30 RX ORDER — OXYTOCIN/RINGER'S LACTATE 20/1000 ML
125-1000 PLASTIC BAG, INJECTION (ML) INTRAVENOUS
Status: DISCONTINUED | OUTPATIENT
Start: 2018-12-30 | End: 2019-01-02 | Stop reason: HOSPADM

## 2018-12-30 RX ORDER — HYDROCORTISONE 1 %
CREAM (GRAM) TOPICAL AS NEEDED
Status: DISCONTINUED | OUTPATIENT
Start: 2018-12-30 | End: 2019-01-02 | Stop reason: HOSPADM

## 2018-12-30 RX ORDER — KETOROLAC TROMETHAMINE 30 MG/ML
30 INJECTION, SOLUTION INTRAMUSCULAR; INTRAVENOUS
Status: DISPENSED | OUTPATIENT
Start: 2018-12-30 | End: 2018-12-31

## 2018-12-30 RX ORDER — PHENYLEPHRINE 10 MG/250 ML(40 MCG/ML)IN 0.9 % SOD.CHLORIDE INTRAVENOUS
Status: DISPENSED
Start: 2018-12-30 | End: 2018-12-31

## 2018-12-30 RX ORDER — CEFAZOLIN SODIUM/WATER 2 G/20 ML
SYRINGE (ML) INTRAVENOUS
Status: DISCONTINUED
Start: 2018-12-30 | End: 2018-12-30 | Stop reason: WASHOUT

## 2018-12-30 RX ORDER — MORPHINE SULFATE 10 MG/ML
10 INJECTION, SOLUTION INTRAMUSCULAR; INTRAVENOUS
Status: DISCONTINUED | OUTPATIENT
Start: 2018-12-30 | End: 2019-01-02 | Stop reason: HOSPADM

## 2018-12-30 RX ORDER — IBUPROFEN 400 MG/1
800 TABLET ORAL EVERY 8 HOURS
Status: DISCONTINUED | OUTPATIENT
Start: 2018-12-30 | End: 2019-01-02 | Stop reason: HOSPADM

## 2018-12-30 RX ORDER — OXYTOCIN/RINGER'S LACTATE 20/1000 ML
999 PLASTIC BAG, INJECTION (ML) INTRAVENOUS AS NEEDED
Status: DISCONTINUED | OUTPATIENT
Start: 2018-12-30 | End: 2019-01-02 | Stop reason: HOSPADM

## 2018-12-30 RX ORDER — OXYTOCIN/RINGER'S LACTATE 20/1000 ML
PLASTIC BAG, INJECTION (ML) INTRAVENOUS
Status: DISCONTINUED | OUTPATIENT
Start: 2018-12-30 | End: 2018-12-30 | Stop reason: HOSPADM

## 2018-12-30 RX ORDER — ONDANSETRON 2 MG/ML
4 INJECTION INTRAMUSCULAR; INTRAVENOUS
Status: DISCONTINUED | OUTPATIENT
Start: 2018-12-30 | End: 2019-01-02 | Stop reason: HOSPADM

## 2018-12-30 RX ORDER — SODIUM CHLORIDE 0.9 % (FLUSH) 0.9 %
5-10 SYRINGE (ML) INJECTION AS NEEDED
Status: DISCONTINUED | OUTPATIENT
Start: 2018-12-30 | End: 2019-01-02 | Stop reason: HOSPADM

## 2018-12-30 RX ORDER — OXYTOCIN/RINGER'S LACTATE 20/1000 ML
PLASTIC BAG, INJECTION (ML) INTRAVENOUS
Status: COMPLETED
Start: 2018-12-30 | End: 2018-12-30

## 2018-12-30 RX ORDER — BUPIVACAINE HYDROCHLORIDE 7.5 MG/ML
INJECTION, SOLUTION EPIDURAL; RETROBULBAR AS NEEDED
Status: DISCONTINUED | OUTPATIENT
Start: 2018-12-30 | End: 2018-12-30 | Stop reason: HOSPADM

## 2018-12-30 RX ORDER — NALOXONE HYDROCHLORIDE 0.4 MG/ML
0.4 INJECTION, SOLUTION INTRAMUSCULAR; INTRAVENOUS; SUBCUTANEOUS AS NEEDED
Status: DISCONTINUED | OUTPATIENT
Start: 2018-12-30 | End: 2019-01-02 | Stop reason: HOSPADM

## 2018-12-30 RX ADMIN — Medication: at 15:36

## 2018-12-30 RX ADMIN — SODIUM CHLORIDE, SODIUM LACTATE, POTASSIUM CHLORIDE, AND CALCIUM CHLORIDE 125 ML/HR: 600; 310; 30; 20 INJECTION, SOLUTION INTRAVENOUS at 16:46

## 2018-12-30 RX ADMIN — SODIUM CHLORIDE, SODIUM LACTATE, POTASSIUM CHLORIDE, AND CALCIUM CHLORIDE 125 ML/HR: 600; 310; 30; 20 INJECTION, SOLUTION INTRAVENOUS at 14:45

## 2018-12-30 RX ADMIN — BUPIVACAINE HYDROCHLORIDE 15 MG: 7.5 INJECTION, SOLUTION EPIDURAL; RETROBULBAR at 15:10

## 2018-12-30 RX ADMIN — CEFAZOLIN 3 G: 1 INJECTION, POWDER, FOR SOLUTION INTRAMUSCULAR; INTRAVENOUS; PARENTERAL at 15:00

## 2018-12-30 RX ADMIN — MORPHINE SULFATE 250 MCG: 0.5 INJECTION, SOLUTION EPIDURAL; INTRATHECAL; INTRAVENOUS at 15:10

## 2018-12-30 RX ADMIN — NALBUPHINE HYDROCHLORIDE 10 MG: 10 INJECTION, SOLUTION INTRAMUSCULAR; INTRAVENOUS; SUBCUTANEOUS at 12:09

## 2018-12-30 RX ADMIN — ONDANSETRON 4 MG: 2 INJECTION INTRAMUSCULAR; INTRAVENOUS at 15:04

## 2018-12-30 RX ADMIN — KETOROLAC TROMETHAMINE 30 MG: 30 INJECTION, SOLUTION INTRAMUSCULAR at 22:44

## 2018-12-30 RX ADMIN — SODIUM CHLORIDE, SODIUM LACTATE, POTASSIUM CHLORIDE, CALCIUM CHLORIDE: 600; 310; 30; 20 INJECTION, SOLUTION INTRAVENOUS at 14:54

## 2018-12-30 RX ADMIN — KETOROLAC TROMETHAMINE 30 MG: 30 INJECTION, SOLUTION INTRAMUSCULAR; INTRAVENOUS at 16:21

## 2018-12-30 RX ADMIN — SODIUM CHLORIDE, SODIUM LACTATE, POTASSIUM CHLORIDE, AND CALCIUM CHLORIDE 500 ML: 600; 310; 30; 20 INJECTION, SOLUTION INTRAVENOUS at 12:00

## 2018-12-30 NOTE — PROGRESS NOTES
Labor Progress Note Patient seen, fetal heart rate and contraction pattern evaluated. Physical Exam: 
Cervical Exam: /-2 Membranes:  Intact Uterine Activity: Frequency: regular Fetal Heart Rate: 130 Accelerations: yes Decelerations: variable Uterine contractions: regular Q 2-5 min Assessment/Plan: 
IUP @ 39 weeks  Active labor Previous Shoulder dystocia- desired Primary  section- previous planned and discussed with Primary OB. We reviewed risk associated with  section Vs , including risk bleeding, infection, injury to bowel, bladder, requiring repair, risk of blood transfusion. After a full discussion she desires to proceed with  Primary  section.  
 
Nohemi Valdes MD

## 2018-12-30 NOTE — ANESTHESIA PROCEDURE NOTES
Spinal Block Performed by: Elaine Dobson DO Authorized by: Elaine Dobson DO  
 
Pre-procedure: Indications: at surgeon's request and primary anesthetic  Preanesthetic Checklist: patient identified, risks and benefits discussed, anesthesia consent, site marked, patient being monitored and timeout performed Spinal Block:  
Patient Position:  Seated Prep: Betadine Location:  L3-4 Technique:  Single shot Needle:  
Needle Type:  Pencil-tip Needle Gauge:  25 G Attempts:  1 Events: CSF confirmed, no blood with aspiration and no paresthesia Assessment: 
Insertion:  Uncomplicated Patient tolerance:  Patient tolerated the procedure well with no immediate complications

## 2018-12-30 NOTE — H&P
History & Physical 
 
Name: Halima Bean MRN: [id number]  SSN: [id number] YOB: 1990  Age: 25 y.o. Sex: female Subjective:  
 
Reason for Admission:  Pregnancy and Contractions and Prior Shoulder Dystocia History of Present Illness: Halima Bean is a 25 y.o.  female with an estimated gestational age of 38w3d with Estimated Date of Delivery: 1/3/19. Patient complains of moderate contractions for 1 days. Pregnancy has been complicated by none. Patient denies chest pain, fever, headache , nausea and vomiting, pelvic pressure, right upper quadrant pain  , shortness of breath, swelling, vaginal bleeding , vaginal leaking of fluid  and visual disturbances. OB History  Para Term  AB Living 3 2 1     2 SAB TAB Ectopic Molar Multiple Live Births  
         0 2 Past Medical History:  
Diagnosis Date  Asthma   
 childhood  Gestational hypertension   
 with daughter (10 years ago) No past surgical history on file. Social History Occupational History  Not on file Tobacco Use  Smoking status: Never Smoker  Smokeless tobacco: Never Used Substance and Sexual Activity  Alcohol use: No  
 Drug use: No  
 Sexual activity: Yes  
  Partners: Male Family History Problem Relation Age of Onset  Diabetes Mother  Diabetes Father No Known Allergies Prior to Admission medications Medication Sig Start Date End Date Taking? Authorizing Provider  
prenatal vit-calcium-iron-fa (PRENATAL PLUS, CALCIUM CARB,) 27 mg iron- 1 mg tab Take 1 Tab by mouth daily. Indications: pregnancy   Yes Provider, Historical  
ibuprofen (MOTRIN) 800 mg tablet Take 1 Tab by mouth every eight (8) hours as needed for Pain (cramping pain). 17   Yazmin Milder., DO  
oxyCODONE-acetaminophen (PERCOCET) 5-325 mg per tablet Take 1 Tab by mouth every six (6) hours as needed. Max Daily Amount: 4 Tabs.  Indications: Pain, severe pain 17   Simeon Stager., DO Review of Systems Objective:  
 
Vitals:   
Vitals:  
 18 0836 18 1110 18 1119 BP:  (!) 131/95 132/77 Pulse:  (!) 108 (!) 103 Resp:   16 Temp:   98.5 °F (36.9 °C) Weight: 131.5 kg (290 lb) Height: 5' 9\" (1.753 m) Temp (24hrs), Av.5 °F (36.9 °C), Min:98.5 °F (36.9 °C), Max:98.5 °F (36.9 °C) BP  Min: 131/95  Max: 132/77 Physical Exam 
 
Cervical Exam: 1-2 cm dilated 60% effaced   
-3 station Presenting Part: cephalic Cervical Position: posterior Consistency: Soft Uterine Activity: Irregular Membranes: Intact Fetal Heart Rate: Baseline: 130 per minute Variability: moderate Accelerations: yes Decelerations: none Uterine irritability: yes Labs: No results found for this or any previous visit (from the past 24 hour(s)). Patient Active Problem List  
Diagnosis Code  Normal labor O80, Z37.9  Abdominal pain during pregnancy in third trimester O26.893, R10.9 Assessment and Plan:  
 
IUP @ 44 WEEKS  WITH CONTRACTIONS Prior Shoulder dystocia with last delivery desired elective  section if in labor and had been scheduled for  Electively as well. Will IV Hydrate and provide PRN pain meds , if cervix change will proceed to  section. Signed By:  Michael Romano MD   
 2018   
  
 
 
 
 
 
istor

## 2018-12-30 NOTE — OP NOTES
Operative Note    Name: Radha Record Number: [id number]   YOB: 1990  Today's Date: 2018      Pre-operative Diagnosis: , PRIOR SHOULDER DYSTOCIA    Post-operative Diagnosis: * No post-op diagnosis entered *    Operation: Low Cervical Transverse Procedure(s):   SECTION    Surgeon(s):  Dayanara Garza MD    Anesthesia: Spinal    Prophylactic Antibiotics: Ancef  DVT Prophylaxis: Sequential Compression Devices         Fetal Description: skinner     Birth Information:   Information for the patient's :  Claire Martinez [[id number]]   Delivery of a   male infant on 2018 at 3:35 PM. Apgars were 9  and 9 . Umbilical Cord:       Umbilical Cord Events:       Placenta: Manual Removal removal with Normal appearance. Amniotic Fluid Volume:  Large     Amniotic Fluid Description:  Clear        Umbilical Cord: 3 vessels present    Placenta:  spontaneous    Specimens: placenta intact           Complications:  none    Procedure Detail:      After proper patient identification and consent, the patient was taken to the operating room, where epidural anesthesia was administered and found to be adequate. Roger catheter had been placed using sterile technique. The patient was prepped and draped in the normal sterile fashion. The abdomen was entered using the Pfannenstiel technique. The peritoneum was entered sharply well superior to the bladder without any apparent injury. The bladder flap was created without difficulty. A low transverse uterine incision was made with the scalpel and extended with blunt finger dissection. Amniotomy was performed and the fluid was small amount clear. The babys head was then delivered atraumatically. The nose and mouth were suctioned. The cord was clamped and cut and the baby was handed off to Nursing staff in attendance. Placenta was then removed from the uterus.  The uterus was curettaged with a moist lap pad and cleared of all clots and debris. The uterine incision was closed with 0 chromic, double layer  in running locking fashion with good hemostasis assured followed by an embricating stitch. The posterior cul-de-sac was irrigated with warm normal saline. Good hemostasis was again reassured and the uterus was returned to the abdomen. The anterior pelvis was irrigated with warm normal saline and good hemostasis was again reassured throughout. The rectus muscles were reapproximated with 2-0 vicryl. The fascia was closed with 0 Vicryl in a running fashion. Good hemostasis was assured. The skin was closed with a 3-0 vicryl subcuticular closure. The patient tolerated the procedure well. Sponge, lap, and needle counts were correct times three and the patient and baby were taken to recovery/postpartum room in stable condition.     Francisca Arora MD  December 30, 2018  4:24 PM

## 2018-12-30 NOTE — ANESTHESIA PREPROCEDURE EVALUATION
Anesthetic History No history of anesthetic complications Review of Systems / Medical History Patient summary reviewed, nursing notes reviewed and pertinent labs reviewed Pulmonary Asthma : well controlled Neuro/Psych Within defined limits Cardiovascular Hypertension Exercise tolerance: >4 METS 
  
GI/Hepatic/Renal 
Within defined limits Endo/Other Morbid obesity Other Findings Physical Exam 
 
Airway Mallampati: II 
TM Distance: > 6 cm Neck ROM: normal range of motion Mouth opening: Normal 
 
 Cardiovascular Regular rate and rhythm,  S1 and S2 normal,  no murmur, click, rub, or gallop Rhythm: regular Rate: normal 
 
 
 
 Dental 
No notable dental hx Pulmonary Breath sounds clear to auscultation Abdominal 
GI exam deferred Other Findings Anesthetic Plan ASA: 3 Anesthesia type: spinal 
 
 
Post-op pain plan if not by surgeon: intrathecal opiates Anesthetic plan and risks discussed with: Patient

## 2018-12-31 LAB
BASOPHILS # BLD: 0 K/UL (ref 0–0.1)
BASOPHILS NFR BLD: 0 % (ref 0–1)
DIFFERENTIAL METHOD BLD: ABNORMAL
EOSINOPHIL # BLD: 0 K/UL (ref 0–0.4)
EOSINOPHIL NFR BLD: 0 % (ref 0–7)
ERYTHROCYTE [DISTWIDTH] IN BLOOD BY AUTOMATED COUNT: 14.3 % (ref 11.5–14.5)
HCT VFR BLD AUTO: 30.9 % (ref 35–47)
HGB BLD-MCNC: 9.9 G/DL (ref 11.5–16)
IMM GRANULOCYTES # BLD: 0 K/UL (ref 0–0.04)
IMM GRANULOCYTES NFR BLD AUTO: 1 % (ref 0–0.5)
LYMPHOCYTES # BLD: 1 K/UL (ref 0.8–3.5)
LYMPHOCYTES NFR BLD: 12 % (ref 12–49)
MCH RBC QN AUTO: 28.5 PG (ref 26–34)
MCHC RBC AUTO-ENTMCNC: 32 G/DL (ref 30–36.5)
MCV RBC AUTO: 89 FL (ref 80–99)
MONOCYTES # BLD: 0.7 K/UL (ref 0–1)
MONOCYTES NFR BLD: 8 % (ref 5–13)
NEUTS SEG # BLD: 7 K/UL (ref 1.8–8)
NEUTS SEG NFR BLD: 79 % (ref 32–75)
NRBC # BLD: 0 K/UL (ref 0–0.01)
NRBC BLD-RTO: 0 PER 100 WBC
PLATELET # BLD AUTO: 200 K/UL (ref 150–400)
PMV BLD AUTO: 11.8 FL (ref 8.9–12.9)
RBC # BLD AUTO: 3.47 M/UL (ref 3.8–5.2)
WBC # BLD AUTO: 8.8 K/UL (ref 3.6–11)

## 2018-12-31 PROCEDURE — 36415 COLL VENOUS BLD VENIPUNCTURE: CPT

## 2018-12-31 PROCEDURE — 74011250636 HC RX REV CODE- 250/636: Performed by: ANESTHESIOLOGY

## 2018-12-31 PROCEDURE — 65410000002 HC RM PRIVATE OB

## 2018-12-31 PROCEDURE — 85025 COMPLETE CBC W/AUTO DIFF WBC: CPT

## 2018-12-31 PROCEDURE — 74011250637 HC RX REV CODE- 250/637: Performed by: OBSTETRICS & GYNECOLOGY

## 2018-12-31 RX ORDER — LANOLIN ALCOHOL/MO/W.PET/CERES
1 CREAM (GRAM) TOPICAL
Status: DISCONTINUED | OUTPATIENT
Start: 2018-12-31 | End: 2019-01-02 | Stop reason: HOSPADM

## 2018-12-31 RX ADMIN — KETOROLAC TROMETHAMINE 30 MG: 30 INJECTION, SOLUTION INTRAMUSCULAR at 10:51

## 2018-12-31 RX ADMIN — KETOROLAC TROMETHAMINE 30 MG: 30 INJECTION, SOLUTION INTRAMUSCULAR at 04:12

## 2018-12-31 RX ADMIN — NALBUPHINE HYDROCHLORIDE 5 MG: 10 INJECTION, SOLUTION INTRAMUSCULAR; INTRAVENOUS; SUBCUTANEOUS at 00:09

## 2018-12-31 RX ADMIN — FERROUS SULFATE TAB 325 MG (65 MG ELEMENTAL FE) 325 MG: 325 (65 FE) TAB at 10:56

## 2018-12-31 RX ADMIN — DOCUSATE SODIUM 100 MG: 100 CAPSULE, LIQUID FILLED ORAL at 10:56

## 2018-12-31 RX ADMIN — IBUPROFEN 800 MG: 400 TABLET, FILM COATED ORAL at 16:50

## 2018-12-31 RX ADMIN — Medication 10 ML: at 10:52

## 2018-12-31 NOTE — ROUTINE PROCESS
Bedside and Verbal shift change report given to Stephanie Villarreal RN (oncoming nurse) by Paul Hyde RN (offgoing nurse). Report included the following information SBAR.

## 2018-12-31 NOTE — PROGRESS NOTES
Bedside shift change report given to Michael Booker RN (oncoming nurse) by RUPERT Handy RN (offgoing nurse). Report included the following information SBAR.

## 2018-12-31 NOTE — ROUTINE PROCESS
8619- SBAR report received from riki Tom RN 
 
1300- assisted to restroom where pt check voided and did well ambulating.

## 2018-12-31 NOTE — PROGRESS NOTES
Progress Note Patient: Suad Orellana MRN: [id number]  SSN: [id number] YOB: 1990  Age: 25 y.o. Sex: female 1 Day Post-Op Subjective:  
 
Patient doing well postpartum without significant complaints. Roger removed but has not voided yet. Passing flatus. Patient reports normal lochia. . Breastfeeding: Yes Objective:  
 
Patient Vitals for the past 18 hrs: 
 Temp Pulse Resp BP SpO2  
18 0409 98.8 °F (37.1 °C) 88 14 107/66   
18 0008 98.3 °F (36.8 °C) 74 14 106/56   
18 2000 97.9 °F (36.6 °C) 93 16 117/77   
18 1925 98.1 °F (36.7 °C) 81 16 116/68   
18 1849 97.6 °F (36.4 °C) 76 16 117/61 98 % 18 1804  66  118/58   
18 1750  70  102/55 94 % 18 1734  77  129/64   
18 1719  79  130/67 97 % 18 1700  76  116/54   
18 1646  87  119/44 90 % 18 1640  71  122/52   
18 1634  76  116/53   
18 1631 98.5 °F (36.9 °C) 78 16 122/60 99 % 18 1629 98.7 °F (37.1 °C) 78 15 138/51 99 % 18 1456 98.4 °F (36.9 °C) 97  150/89  Temp (24hrs), Av.3 °F (36.8 °C), Min:97.6 °F (36.4 °C), Max:98.8 °F (37.1 °C) Physical Exam:   
Patient without distress. Heart: Regular rate and rhythm or S1S2 present Lung: clear to auscultation throughout lung fields, no wheezes, no rales, no rhonchi and normal respiratory effort Abdomen: soft, nontender Incision: dressing clean and dry without surrounding erythema Fundus: firm, non tender and at umbilicus Lower Extremities: no calf tenderness Lab/Data Review: CBC:   
Recent Labs 18 
0458 18 99 [facility] WBC 8.8 7.7 HGB 9.9* 12.2 HCT 30.9* 37.5  242 Assessment and Plan:  
 
Patient appears to be having an uncomplicated post- course. Continue routine postoperative care and maternal education. Anemia 2/2 acute blood loss at time of surgery- iron daily Boy- circ today A positive, RNI- MMR prior to discharge Signed By: Yamila Villafuerte MD   
 December 31, 2018

## 2018-12-31 NOTE — LACTATION NOTE
This note was copied from a baby's chart. Initial Lactation Consultation - Baby born  yesterday afternoon to a  mom at  44 3/7 weeks gestation. Mom states she attempted to breast feed her other 2 children. The first child didn't latch well and the second child mom states she did not produce enough milk. She says this baby has been latching and nursing well. I did not see the baby at the breast. 
 
Feeding Plan: Mother will keep baby skin to skin as often as possible, feed on demand, respond to feeding cues, obtain latch, listen for audible swallowing, be aware of signs of oxytocin release/ cramping, thirst, and sleepness while breastfeeding. Mom will not limit the time the baby at the breast. She will completely finish one breast and then offer the second breast at each feeding.

## 2019-01-01 PROCEDURE — 74011250637 HC RX REV CODE- 250/637: Performed by: OBSTETRICS & GYNECOLOGY

## 2019-01-01 PROCEDURE — 65410000002 HC RM PRIVATE OB

## 2019-01-01 RX ADMIN — DOCUSATE SODIUM 100 MG: 100 CAPSULE, LIQUID FILLED ORAL at 10:25

## 2019-01-01 RX ADMIN — OXYCODONE AND ACETAMINOPHEN 1 TABLET: 5; 325 TABLET ORAL at 04:38

## 2019-01-01 RX ADMIN — IBUPROFEN 800 MG: 400 TABLET, FILM COATED ORAL at 18:35

## 2019-01-01 RX ADMIN — OXYCODONE AND ACETAMINOPHEN 1 TABLET: 5; 325 TABLET ORAL at 10:23

## 2019-01-01 RX ADMIN — OXYCODONE AND ACETAMINOPHEN 1 TABLET: 5; 325 TABLET ORAL at 00:16

## 2019-01-01 RX ADMIN — FERROUS SULFATE TAB 325 MG (65 MG ELEMENTAL FE) 325 MG: 325 (65 FE) TAB at 10:25

## 2019-01-01 RX ADMIN — IBUPROFEN 800 MG: 400 TABLET, FILM COATED ORAL at 10:24

## 2019-01-01 RX ADMIN — OXYCODONE AND ACETAMINOPHEN 1 TABLET: 5; 325 TABLET ORAL at 23:21

## 2019-01-01 RX ADMIN — OXYCODONE AND ACETAMINOPHEN 1 TABLET: 5; 325 TABLET ORAL at 18:35

## 2019-01-01 RX ADMIN — IBUPROFEN 800 MG: 400 TABLET, FILM COATED ORAL at 01:00

## 2019-01-01 NOTE — PROGRESS NOTES
Post-Operative Day Number 2 Progress Note Patient doing well post-op day 2 from  delivery without significant complaints. Pain controlled on current medication. Voiding without difficulty, normal lochia. Passing flatus. Ambulating without problem. Denies cp/sob/n/v.  
 
Vitals:   
Patient Vitals for the past 8 hrs: 
 BP Temp Pulse Resp  
19 0100 129/77 98 °F (36.7 °C) 94 18 Temp (24hrs), Av.3 °F (36.8 °C), Min:98 °F (36.7 °C), Max:98.5 °F (36.9 °C) Vital signs stable, afebrile. Exam:  Patient without distress. Heart regular rate and rhythm Lungs CTA b/l Abdomen soft, fundus firm at level of umbilicus, non tender. Bandage dry and clean without surrounding erythema. Lower extremities are negative for swelling, cords or tenderness. Lab/Data Review: 
no new labs Assessment and Plan:  Patient appears to be having uncomplicated post- course. Continue routine post-op care and maternal education. Boy-s/p circ. Rubella non-immunue-MMR prior to discharge

## 2019-01-01 NOTE — PROGRESS NOTES
8:25 AM 
Bedside shift change report given to Jeff Kilgore RN (oncoming nurse) by D. Rhodia Spatz, RN (offgoing nurse). Report included the following information SBAR.

## 2019-01-01 NOTE — ROUTINE PROCESS
Bedside shift change report given to JAMES Harrington RN (oncoming nurse) by KULDIP Aguero RN (offgoing nurse).  Report included the following information SBAR

## 2019-01-02 VITALS
WEIGHT: 290 LBS | RESPIRATION RATE: 16 BRPM | OXYGEN SATURATION: 98 % | HEART RATE: 100 BPM | SYSTOLIC BLOOD PRESSURE: 125 MMHG | HEIGHT: 69 IN | TEMPERATURE: 99.8 F | DIASTOLIC BLOOD PRESSURE: 77 MMHG | BODY MASS INDEX: 42.95 KG/M2

## 2019-01-02 PROCEDURE — 90707 MMR VACCINE SC: CPT | Performed by: OBSTETRICS & GYNECOLOGY

## 2019-01-02 PROCEDURE — 90471 IMMUNIZATION ADMIN: CPT

## 2019-01-02 PROCEDURE — 74011250636 HC RX REV CODE- 250/636: Performed by: OBSTETRICS & GYNECOLOGY

## 2019-01-02 PROCEDURE — 74011250637 HC RX REV CODE- 250/637: Performed by: OBSTETRICS & GYNECOLOGY

## 2019-01-02 RX ORDER — OXYCODONE AND ACETAMINOPHEN 5; 325 MG/1; MG/1
1 TABLET ORAL
Qty: 20 TAB | Refills: 0 | Status: SHIPPED | OUTPATIENT
Start: 2019-01-02 | End: 2021-12-16

## 2019-01-02 RX ORDER — IBUPROFEN 800 MG/1
800 TABLET ORAL EVERY 8 HOURS
Qty: 30 TAB | Refills: 0 | Status: SHIPPED | OUTPATIENT
Start: 2019-01-02 | End: 2021-12-16

## 2019-01-02 RX ADMIN — IBUPROFEN 800 MG: 400 TABLET, FILM COATED ORAL at 10:55

## 2019-01-02 RX ADMIN — MEASLES, MUMPS, AND RUBELLA VIRUS VACCINE LIVE 0.5 ML: 1000; 12500; 1000 INJECTION, POWDER, LYOPHILIZED, FOR SUSPENSION SUBCUTANEOUS at 10:57

## 2019-01-02 RX ADMIN — IBUPROFEN 800 MG: 400 TABLET, FILM COATED ORAL at 02:29

## 2019-01-02 NOTE — ROUTINE PROCESS
Bedside SBAR received from Stacey Rosa RN. I have reviewed discharge instructions with the patient. The patient verbalized understanding.

## 2019-01-02 NOTE — LACTATION NOTE
This note was copied from a baby's chart. Mom and baby scheduled for discharge today. Mom says she has been having difficulty getting the baby to maintain the latch. She is able to get him latched and sucking for 1-5 minutes at each feeding. Her milk came in over night and now her breasts are very engorged. She began pumping this morning and was able to collect 15 cc's of breast milk. I worked with mom this morning getting the baby latched. It took multiple attempts to get him to latch. We tried several different positions. We were able to get him to nurse for about 5 minutes on each breast. I tried to keep him nursing at the breast by putting some breast milk in his mouth while nursing. He would fall asleep at the breast but get very fussy when taken off of mom. I showed mom how to syringe feed the breast milk to the baby. I then gave her some ice packs to put on her engorged breasts for 15 minutes. Mom will attempt to breast feed with assistance at least one more time before discharge. Pediatrician has ordered for baby to feed every 2-3 hours and then supplement with 15-20 cc's of breast milk or formula. Mom has a breast pump at her home. She will be following up with the pediatrician in the morning. Baby's weight loss is 9.6% this morning. 1100 - Baby was fussy and showing feeding cues. I helped mom get the baby next to her in the football hold. Moms breasts are very engorged. I put a small amount of expressed breast milk on moms nipple to help baby latch. After several attempts baby was able to get a deep latch and we heard him swallowing. We were able to get him latched on both breasts with much assistance. We had to keep stimulating him and putting some breast milk and then formula on the nipple. Mom was able to syringe feed formula to the baby after nursing. Mom will pump after nursing and save her breast milk for the next feeding. Mom will attempt to breast feed every 2-3 hours. She will not wait until the baby is fussy and hungry before she attempts to nurse. She will supplement up to 20 cc's of breast milk when available or formula after nursing.

## 2019-01-02 NOTE — DISCHARGE INSTRUCTIONS
POSTPARTUM DISCHARGE INSTRUCTIONS       Name:  Robin Beatty  YOB: 1990  Admission Diagnosis:  , PRIOR SHOULDER DYSTOCIA  Abdominal pain during pregnancy in third trimester  Pregnancy     Discharge Diagnosis:    Problem List as of 2019 Never Reviewed          Codes Class Noted - Resolved    Abdominal pain during pregnancy in third trimester ICD-10-CM: O26.893, R10.9  ICD-9-CM: 646.83, 789.00  2018 - Present        Pregnancy ICD-10-CM: Z34.90  ICD-9-CM: V22.2  2018 - Present        Normal labor ICD-10-CM: O80, Z37.9  ICD-9-CM: 314  2017 - Present            Attending Physician:  Cindy Cardenas MD    Delivery Type:   Section: What to Expect at Home    Your Recovery:  A  section, or , is surgery to deliver your baby through a cut, called an incision that the doctor makes in your lower belly and uterus. You may have some pain in your lower belly and need pain medicine for 1 to 2 weeks. You can expect some vaginal bleeding for several weeks. You will probably need about 6 weeks to fully recover. It is important to take it easy while the incision is healing. Avoid heavy lifting, strenuous activities, or exercises that strain the belly muscles while you are recovering. Ask a family member or friend for help with housework, cooking, and shopping. This care sheet gives you a general idea about how long it will take for you to recover. But each person recovers at a different pace. Follow the steps below to get better as quickly as possible. How can you care for yourself at home? Activity  · Rest when you feel tired. Getting enough sleep will help you recover. · Try to walk each day. Start by walking a little more than you did the day before. Bit by bit, increase the amount you walk. Walking boosts blood flow and helps prevent pneumonia, constipation, and blood clots.   · Avoid strenuous activities, such as bicycle riding, jogging, weightlifting, and aerobic exercise, for 6 weeks or until your doctor says it is okay. · Until your doctor says it is okay, do not lift anything heavier than your baby. · Do not do sit-ups or other exercise that strain the belly muscles for 6 weeks or until your doctor says it is okay. · Hold a pillow over your incision when you cough or take deep breaths. This will support your belly and decrease your pain. · You may shower as usual. Pat the incision dry when you are done. · You will have some vaginal bleeding. Wear sanitary pads. Do not douche or use tampons until your doctor says it is okay. · Ask your doctor when you can drive again. · You will probably need to take at least 6 weeks off work. It depends on the type of work you do and how you feel. · Wait until you are healed (about 4 to 6 weeks) before you have sexual intercourse. Your doctor will tell you when it is okay to have sex. · Talk to your doctor about birth control. You can get pregnant even before your period returns. Also, you can get pregnant while you are breast-feeding. Incision care  Your skin is your body's first line of defense against germs, but an incision site leaves an easy way for germs to enter your body. To prevent infection:  · Clean your hands frequently and before and after changing any touching any dressings. · If you have strips of tape on the incision, leave the tape on for a week or until it falls off. · Look at your incision closely every day for any changes. Contact your doctor if you experience any signs of infection, such as fever or increased redness at the surgical site. · Wash the area daily with warm, soapy water, and pat it dry. Don't use hydrogen peroxide or alcohol, which can slow healing. You may cover the area with a gauze bandage if it weeps or rubs against clothing. Change the bandage every day. · Keep the area clean and dry. Diet  · You can eat your normal diet.  If your stomach is upset, try bland, low-fat foods like plain rice, broiled chicken, toast, and yogurt. · Drink plenty of fluids (unless your doctor tells you not to). · You may notice that your bowel movements are not regular right after your surgery. This is common. Try to avoid constipation and straining with bowel movements. You may want to take a fiber supplement every day. If you have not had a bowel movement after a couple of days, ask your doctor about taking a mild laxative. · If you are breast-feeding, do not drink any alcohol. Medicines  · Take pain medicines exactly as directed. · If the doctor gave you a prescription medicine for pain, take it as prescribed. · If you are not taking a prescription pain medicine, ask your doctor if you can take an over-the-counter medicine such as acetaminophen (Tylenol), ibuprofen (Advil, Motrin), or naproxen (Aleve), for cramps. Read and follow all instructions on the label. Do not take aspirin, because it can cause more bleeding. Do not take acetaminophen (Tylenol) and other acetaminophen containing medications (i.e. Percocet) at the same time. · If you think your pain medicine is making you sick to your stomach:  · Take your medicine after meals (unless your doctor has told you not to). · Ask your doctor for a different pain medicine. · If your doctor prescribed antibiotics, take them as directed. Do not stop taking them just because you feel better. You need to take the full course of antibiotics. Mental Health  · Many women get the \"baby blues\" during the first few days after childbirth. You may lose sleep, feel irritable, and cry easily. You may feel happy one minute and sad the next. Hormone changes are one cause of these emotional changes. Also, the demands of a new baby, along with visits from relatives or other family needs, add to a mother's stress. The \"baby blues\" often peak around the fourth day. Then they ease up in less than 2 weeks.   · If your moodiness or anxiety lasts for more than 2 weeks, or if you feel like life is not worth living, you may have postpartum depression. This is different for each mother. Some mothers with serious depression may worry intensely about their infant's well-being. Others may feel distant from their child. Some mothers might even feel that they might harm their baby. A mother may have signs of paranoia, wondering if someone is watching her. · With all the changes in your life, you may not know if you are depressed. Pregnancy sometimes causes changes in how you feel that are similar to the symptoms of depression. · Symptoms of depression include:  · Feeling sad or hopeless and losing interest in daily activities. These are the most common symptoms of depression. · Sleeping too much or not enough. · Feeling tired. You may feel as if you have no energy. · Eating too much or too little. · POSTPARTUM [employer] INTER[employer]ATIONAL (PSI) offers a Warm line; Chat with the Expert phone sessions; Information and Articles about Pregnancy and Postpartum Mood Disorders; Comprehensive List of Free Support Groups; Knowledgeable local coordinators who will offer support, information, and resources; Guide to Resources on [employer]; Calendar of events in the  mood disorders community; Latest News and Research; and [facility] & [facility] [address] for [employer]. Remember - You are not alone; You are not to blame; With help, you will be well. 3-805-167-PPD(1208). WWW. POSTPARTUM. NET   · Writing or talking about death, such as writing suicide notes or talking about guns, knives, or pills. Keep the numbers for these national suicide hotlines: 1-088-214-TALK (8-501.537.3994) and 2-598-VBUBGCL (6-315.996.6827). If you or someone you know talks about suicide or feeling hopeless, get help right away.     Other instructions  · If you breast-feed your baby, you may be more comfortable while you are healing if you place the baby so that he or she is not resting on your belly. Try tucking your baby under your arm, with his or her body along the side you will be feeding on. Support your baby's upper body with your arm. With that hand you can control your baby's head to bring his or her mouth to your breast. This is sometimes called the football hold. Follow-up care is a key part of your treatment and safety. Be sure to make and go to all appointments, and call your doctor if you are having problems. It's also a good idea to know your test results and keep a list of the medicines you take. When should you call for help? Call 911 anytime you think you may need emergency care. For example, call if:  · You are thinking of hurting yourself, your baby, or anyone else. · You passed out (lost consciousness). · You have symptoms of a blood clot in your lung (called a pulmonary embolism). These may include:  · Sudden chest pain. · Trouble breathing. · Coughing up blood. Call your doctor now or seek immediate medical care if:    · You have severe vaginal bleeding. · You are soaking through a pad each hour for 2 or more hours. · Your vaginal bleeding seems to be getting heavier or is still bright red 4 days after delivery. · You are dizzy or lightheaded, or you feel like you may faint. · You are vomiting or cannot keep fluids down. · You have a fever. · You have new or more belly pain. · You have loose stitches, or your incision comes open. · You have symptoms of infection, such as:  · Increased pain, swelling, warmth, or redness. · Red streaks leading from the incision. · Pus draining from the incision. · A fever  · You pass tissue (not just blood). · Your vaginal discharge smells bad. · Your belly feels tender or full and hard. · Your breasts are continuously painful or red. · You feel sad, anxious, or hopeless for more than a few days. · You have sudden, severe pain in your belly.   · You have symptoms of a blood clot in your leg (called a deep vein thrombosis), such as:  · Pain in your calf, back of the knee, thigh, or groin. · Redness and swelling in your leg or groin. · You have symptoms of preeclampsia, such as:  · Sudden swelling of your face, hands, or feet. · New vision problems (such as dimness or blurring). · A severe headache. · Your blood pressure is higher than it should be or rises suddenly. · You have new nausea or vomiting. Watch closely for changes in your health, and be sure to contact your doctor if you have any problems. Additional Information:  {Postpartum Pregnancy Complications:61454}    These are general instructions for a healthy lifestyle:    No smoking/ No tobacco products/ Avoid exposure to second hand smoke    Surgeon General's Warning:  Quitting smoking now greatly reduces serious risk to your health. Obesity, smoking, and sedentary lifestyle greatly increases your risk for illness    A healthy diet, regular physical exercise & weight monitoring are important for maintaining a healthy lifestyle    Recognize signs and symptoms of STROKE:    F-face looks uneven    A-arms unable to move or move unevenly    S-speech slurred or non-existent    T-time-call 911 as soon as signs and symptoms begin - DO NOT go       back to bed or wait to see if you get better - TIME IS BRAIN. I have had the opportunity to make my options or choices for discharge. I have received and understand these instructions.

## 2019-01-02 NOTE — PROGRESS NOTES
Progress Note Patient: Kirti Ramachandran MRN: [id number]  SSN: [id number] YOB: 1990  Age: 25 y.o. Sex: female 3 Days Post-Op Subjective:  
 
Patient doing well postpartum without significant complaints. Voiding without difficulty. Passing flatus. Denies fever, chills, SOB, chest pain. Patient reports normal lochia. . Breastfeeding: Yes but having difficulty with baby latching. Awaiting lactation consultation. Objective:  
 
Patient Vitals for the past 18 hrs: 
 Temp Pulse Resp BP  
19 0404 98.6 °F (37 °C) 89 16 118/77  
19 2105 98.5 °F (36.9 °C) 99 15 124/75  
19 1820 98.5 °F (36.9 °C) 96 15 130/86 Temp (24hrs), Av.4 °F (36.9 °C), Min:97.9 °F (36.6 °C), Max:98.6 °F (37 °C) Physical Exam:   
Patient without distress. Heart: Regular rate and rhythm or S1S2 present Lung: clear to auscultation throughout lung fields, no wheezes, no rales, no rhonchi and normal respiratory effort Abdomen: soft, nontender Incision: incision dressing clean and dry Fundus: firm and non tender Lower Extremities: no calf tenderness Lab/Data Review: CBC:   
Recent Labs 18 
0458 18 [address] [place] [address] WBC 8.8 7.7 HGB 9.9* 12.2 HCT 30.9* 37.5  242 Assessment and Plan:  
 
Patient appears to be having an uncomplicated post- course. Continue routine postoperative care and maternal education. , Will discharge home today. Boy- s/p circ A pos, MMR prior to discharge Signed By: Eron Watt MD   
 2019

## 2019-01-02 NOTE — DISCHARGE SUMMARY
Obstetrical Discharge Summary     Name: Robin Beatty MRN: [id number]  SSN: [id number]    YOB: 1990  Age: 25 y.o. Sex: female      Allergies: Patient has no known allergies. Admit Date: 2018    Discharge Date: 2019     Admitting Physician: Zeke Singleton MD     Attending Physician:  Cindy Cardenas MD     * Admission Diagnoses: , PRIOR SHOULDER DYSTOCIA  Abdominal pain during pregnancy in third trimester  Pregnancy    * Discharge Diagnoses:   Information for the patient's :  Farida Cordoba [[id number]]   Delivery of a 3.87 kg male infant via , Low Transverse on 2018 at 3:35 PM  by . Apgars were 9 and 9. Additional Diagnoses:   Hospital Problems as of 2019 Never Reviewed          Codes Class Noted - Resolved POA    Abdominal pain during pregnancy in third trimester ICD-10-CM: O26.893, R10.9  ICD-9-CM: 646.83, 789.00  2018 - Present Unknown        Pregnancy ICD-10-CM: Z34.90  ICD-9-CM: V22.2  2018 - Present Unknown             Lab Results   Component Value Date/Time    ABO/Rh(D) A POSITIVE 2018 08:46 AM    Rubella, External non immune 2018    GrBStrep, External positive 2018    ABO,Rh A+ 2017    There is no immunization history for the selected administration types on file for this patient.     * Procedures:  section  Procedure(s) with comments:   SECTION - [facility] 1/3/19      [place]  Depression Scale  I have been able to laugh and see the funny side of things: As much as I always could  I have looked forward with enjoyment to things: As much as I ever did  I have blamed myself unnecessarily when things went wrong: Not very often  I have been anxious or worried for no good reason: No, not at all  I have felt scared or panicky for no very good reason: No, not at all  Things have been getting on top of me: No, most of the time I have coped quite well  I have been so unhappy that I have had difficulty sleeping: Not very often  I have felt sad or miserable: Not very often  I have been so unhappy that I have been crying: Only occasionally  The thought of harming myself has occurred to me: Never  Total Score: 5    * Discharge Condition: stable    * Hospital Course: Normal hospital course following the delivery. * Disposition: Home    Discharge Medications:   Current Discharge Medication List      CONTINUE these medications which have CHANGED    Details   ibuprofen (MOTRIN) 800 mg tablet Take 1 Tab by mouth every eight (8) hours. Qty: 30 Tab, Refills: 0      oxyCODONE-acetaminophen (PERCOCET) 5-325 mg per tablet Take 1 Tab by mouth every four (4) hours as needed. Max Daily Amount: 6 Tabs. Qty: 20 Tab, Refills: 0    Associated Diagnoses: Pain         CONTINUE these medications which have NOT CHANGED    Details   prenatal vit-calcium-iron-fa (PRENATAL PLUS, CALCIUM CARB,) 27 mg iron- 1 mg tab Take 1 Tab by mouth daily. Indications: pregnancy             * Follow-up Care/Patient Instructions: Activity: No sex for 6 weeks and No driving while on analgesics  Diet: Regular Diet  Wound Care: As directed    Follow-up Information     Follow up With Specialties Details Why Contact Info    Jono Pablo MD Obstetrics & Gynecology, Gynecology, Obstetrics   [address] [facility] [address] [phone]             Signed By:  Darrel Lager Dyanna Homans, MD     January 2, 2019

## 2019-01-02 NOTE — ROUTINE PROCESS
Bedside shift change report given to BONNIE Crawley RN (oncoming nurse) by Carlene Olivares RN (offgoing nurse). Report included the following information SBAR, Kardex, Intake/Output, MAR and Recent Results.

## 2019-03-22 ENCOUNTER — HOSPITAL ENCOUNTER (EMERGENCY)
Age: 29
Discharge: HOME | End: 2019-03-22
Attending: EMERGENCY MEDICINE
Payer: MEDICAID

## 2019-03-22 ENCOUNTER — APPOINTMENT (OUTPATIENT)
Dept: CT IMAGING | Age: 29
End: 2019-03-22
Attending: EMERGENCY MEDICINE
Payer: MEDICAID

## 2019-03-22 VITALS
HEART RATE: 82 BPM | SYSTOLIC BLOOD PRESSURE: 128 MMHG | OXYGEN SATURATION: 99 % | TEMPERATURE: 98.1 F | HEIGHT: 69 IN | RESPIRATION RATE: 17 BRPM | BODY MASS INDEX: 41.01 KG/M2 | DIASTOLIC BLOOD PRESSURE: 72 MMHG | WEIGHT: 276.9 LBS

## 2019-03-22 DIAGNOSIS — G51.0 BELL'S PALSY: Primary | ICD-10-CM

## 2019-03-22 LAB
ANION GAP SERPL CALC-SCNC: 6 MMOL/L (ref 5–15)
BASOPHILS # BLD: 0 K/UL (ref 0–0.1)
BASOPHILS NFR BLD: 1 % (ref 0–1)
BUN SERPL-MCNC: 9 MG/DL (ref 6–20)
BUN/CREAT SERPL: 11 (ref 12–20)
CALCIUM SERPL-MCNC: 8.9 MG/DL (ref 8.5–10.1)
CHLORIDE SERPL-SCNC: 107 MMOL/L (ref 97–108)
CO2 SERPL-SCNC: 25 MMOL/L (ref 21–32)
COMMENT, HOLDF: NORMAL
CREAT SERPL-MCNC: 0.83 MG/DL (ref 0.55–1.02)
DIFFERENTIAL METHOD BLD: ABNORMAL
EOSINOPHIL # BLD: 0.1 K/UL (ref 0–0.4)
EOSINOPHIL NFR BLD: 1 % (ref 0–7)
ERYTHROCYTE [DISTWIDTH] IN BLOOD BY AUTOMATED COUNT: 13.7 % (ref 11.5–14.5)
GLUCOSE SERPL-MCNC: 83 MG/DL (ref 65–100)
HCT VFR BLD AUTO: 40.8 % (ref 35–47)
HGB BLD-MCNC: 12.8 G/DL (ref 11.5–16)
IMM GRANULOCYTES # BLD AUTO: 0 K/UL (ref 0–0.04)
IMM GRANULOCYTES NFR BLD AUTO: 0 % (ref 0–0.5)
INR PPP: 1 (ref 0.9–1.1)
LYMPHOCYTES # BLD: 2.1 K/UL (ref 0.8–3.5)
LYMPHOCYTES NFR BLD: 46 % (ref 12–49)
MAGNESIUM SERPL-MCNC: 2.1 MG/DL (ref 1.6–2.4)
MCH RBC QN AUTO: 26.9 PG (ref 26–34)
MCHC RBC AUTO-ENTMCNC: 31.4 G/DL (ref 30–36.5)
MCV RBC AUTO: 85.9 FL (ref 80–99)
MONOCYTES # BLD: 0.4 K/UL (ref 0–1)
MONOCYTES NFR BLD: 8 % (ref 5–13)
NEUTS SEG # BLD: 2 K/UL (ref 1.8–8)
NEUTS SEG NFR BLD: 44 % (ref 32–75)
NRBC # BLD: 0.02 K/UL (ref 0–0.01)
NRBC BLD-RTO: 0.4 PER 100 WBC
PLATELET # BLD AUTO: 327 K/UL (ref 150–400)
PMV BLD AUTO: 11.4 FL (ref 8.9–12.9)
POTASSIUM SERPL-SCNC: 3.9 MMOL/L (ref 3.5–5.1)
PROTHROMBIN TIME: 10.3 SEC (ref 9–11.1)
RBC # BLD AUTO: 4.75 M/UL (ref 3.8–5.2)
SAMPLES BEING HELD,HOLD: NORMAL
SODIUM SERPL-SCNC: 138 MMOL/L (ref 136–145)
WBC # BLD AUTO: 4.5 K/UL (ref 3.6–11)

## 2019-03-22 PROCEDURE — 36415 COLL VENOUS BLD VENIPUNCTURE: CPT

## 2019-03-22 PROCEDURE — 85025 COMPLETE CBC W/AUTO DIFF WBC: CPT

## 2019-03-22 PROCEDURE — 85610 PROTHROMBIN TIME: CPT

## 2019-03-22 PROCEDURE — 74011636637 HC RX REV CODE- 636/637: Performed by: EMERGENCY MEDICINE

## 2019-03-22 PROCEDURE — 99285 EMERGENCY DEPT VISIT HI MDM: CPT

## 2019-03-22 PROCEDURE — 70450 CT HEAD/BRAIN W/O DYE: CPT

## 2019-03-22 PROCEDURE — 83735 ASSAY OF MAGNESIUM: CPT

## 2019-03-22 PROCEDURE — 80048 BASIC METABOLIC PNL TOTAL CA: CPT

## 2019-03-22 RX ORDER — PREDNISONE 20 MG/1
60 TABLET ORAL
Status: COMPLETED | OUTPATIENT
Start: 2019-03-22 | End: 2019-03-22

## 2019-03-22 RX ORDER — PREDNISONE 20 MG/1
60 TABLET ORAL DAILY
Qty: 18 TAB | Refills: 0 | Status: SHIPPED | OUTPATIENT
Start: 2019-03-23 | End: 2019-03-29

## 2019-03-22 RX ADMIN — PREDNISONE 60 MG: 20 TABLET ORAL at 17:39

## 2019-03-22 NOTE — ED TRIAGE NOTES
Arrived from home via EMS c/o left facial numbness and tingling since 1030 am morning. left eye twitching and headache since yesterday. 1635 Code stroke called. Escorted to CT, this RN stayed with patient. 1642 Patient escorted to room 4 and ambulated to bathroom by herself, with steady gait. 1652 Code stroke cancelled. MD at bedside. Patient moved to focus care. 1701 Urine placed on hold.

## 2019-03-22 NOTE — DISCHARGE INSTRUCTIONS
Patient Education        Bell's Palsy: Care Instructions  Your Care Instructions    Bell's palsy is paralysis or weakness of the muscles on one side of the face. Often people with Bell's palsy have a droop on one side of the mouth and have trouble completely shutting the eye on the same side. Bell's palsy can also interfere with the sense of taste. These things happen when a nerve in your face becomes inflamed. Bell's palsy is not caused by a stroke. The cause of the nerve inflammation is not known. But some experts think that a virus may cause it. Because of this, doctors sometimes prescribe antiviral medicine to treat it. You also may get medicine to reduce swelling. Bell's palsy usually gets better on its own in a few weeks or months. Follow-up care is a key part of your treatment and safety. Be sure to make and go to all appointments, and call your doctor if you are having problems. It's also a good idea to know your test results and keep a list of the medicines you take. How can you care for yourself at home? · Take your medicines exactly as prescribed. Call your doctor if you think you are having a problem with your medicine. You will get more details on the specific medicines your doctor prescribes. · Use artificial tears or ointment if your eyes are too dry. Bell's palsy can make your lower eyelid droop, causing a dry eye. · If you cannot completely close your eye, consider using an eye patch while you sleep. · Help yourself blink by using your finger to close and open your eyelid. This may help keep your eye moist.  · Wear glasses or goggles to keep dust and dirt out of your eye. · As feeling comes back to your face, massage your forehead, cheeks, and lips. Massage may make the muscles in your face stronger. · Brush and floss your teeth often to help prevent tooth decay. Bell's palsy can dry up the spit on one side of your mouth. This increases the risk of tooth decay.   When should you call for help?  Call 911 anytime you think you may need emergency care. For example, call if:    · You have symptoms of a stroke. These may include:  ? Sudden numbness, tingling, weakness, or loss of movement in your face, arm, or leg, especially on only one side of your body. ? Sudden vision changes. ? Sudden trouble speaking. ? Sudden confusion or trouble understanding simple statements. ? Sudden problems with walking or balance. ? A sudden, severe headache that is different from past headaches.    Call your doctor now or seek immediate medical care if:    · You have numbness or weakness that spreads beyond one side of your face.     · You have a skin rash or eye pain or redness, or light bothers your eyes.     · You have a new or worse headache.    Watch closely for changes in your health, and be sure to contact your doctor if:    · You do not get better as expected. Where can you learn more? Go to http://aileen-georgette.info/. Enter P168 in the search box to learn more about \"Bell's Palsy: Care Instructions. \"  Current as of: Ava 3, 2018  Content Version: 11.9  © 3861-0126 [employer]. Care instructions adapted under license by [employer] (which disclaims liability or warranty for this information). If you have questions about a medical condition or this instruction, always ask your healthcare professional. [address] any warranty or liability for your use of this information.

## 2019-03-22 NOTE — ED PROVIDER NOTES
25 y.o. female with past medical history significant for asthma and gestational HTN who presents from home with chief complaint of facial numbness. Pt reports she noticed L eye \"twitching\" yesterday and her entire L face has felt numb today w/ slight L-sided facial droop. Pt notes she has recently been stressed d/t raising 3 children by herself. When asked about surgery, Pt states she had a  on 16. She is currently on Nuvaring for birth control, but denies other regular medications. Pt denies rash. There are no other acute medical concerns at this time. Social hx: denies smoking    Significant FMHx: DM, HTN    PCP: Shen Rivera MD    Note written by Brenda Pascual, as dictated by Eddie Joyner MD 4:51 PM           Past Medical History:   Diagnosis Date    Asthma     childhood    Gestational hypertension     with daughter (10 years ago)       History reviewed. No pertinent surgical history.       Family History:   Problem Relation Age of Onset    Diabetes Mother     Diabetes Father        Social History     Socioeconomic History    Marital status: SINGLE     Spouse name: Not on file    Number of children: 1    Years of education: Not on file    Highest education level: Not on file   Occupational History    Not on file   Social Needs    Financial resource strain: Not on file    Food insecurity:     Worry: Not on file     Inability: Not on file    Transportation needs:     Medical: Not on file     Non-medical: Not on file   Tobacco Use    Smoking status: Never Smoker    Smokeless tobacco: Never Used   Substance and Sexual Activity    Alcohol use: No    Drug use: No    Sexual activity: Yes     Partners: Male   Lifestyle    Physical activity:     Days per week: Not on file     Minutes per session: Not on file    Stress: Not on file   Relationships    Social connections:     Talks on phone: Not on file     Gets together: Not on file     Attends Christian service: Not on file Active member of club or organization: Not on file     Attends meetings of clubs or organizations: Not on file     Relationship status: Not on file    Intimate partner violence:     Fear of current or ex partner: Not on file     Emotionally abused: Not on file     Physically abused: Not on file     Forced sexual activity: Not on file   Other Topics Concern     Service Not Asked    Blood Transfusions Not Asked    Caffeine Concern Not Asked    Occupational Exposure Not Asked   Liana Polio Hazards Not Asked    Sleep Concern Not Asked    Stress Concern Not Asked    Weight Concern Not Asked    Special Diet Not Asked    Back Care Not Asked    Exercise Not Asked    Bike Helmet Not Asked   [address] Not Asked    Self-Exams Not Asked   Social History Narrative    Not on file         ALLERGIES: Patient has no known allergies. Review of Systems   Constitutional: Negative for chills and fever. Respiratory: Negative for shortness of breath. Cardiovascular: Negative for chest pain. Gastrointestinal: Negative for abdominal pain, constipation, diarrhea and vomiting. Skin: Negative for rash. Neurological: Positive for facial asymmetry and numbness. Negative for dizziness, speech difficulty and light-headedness. All other systems reviewed and are negative. Vitals:    03/22/19 1629 03/22/19 1646   BP:  132/75   Pulse: 82 80   Resp:  18   Temp:  98.2 °F (36.8 °C)   SpO2: 99% 99%   Weight:  125.6 kg (276 lb 14.4 oz)   Height:  5' 9\" (1.753 m)            Physical Exam   Constitutional: She is oriented to person, place, and time. She appears well-developed and well-nourished. HENT:   Head: Normocephalic and atraumatic. Eyes: Pupils are equal, round, and reactive to light. Neck: Normal range of motion. Neck supple. Cardiovascular: Normal rate and regular rhythm. Pulmonary/Chest: Effort normal and breath sounds normal.   Abdominal: Soft. She exhibits no distension.  There is no tenderness. Neurological: She is alert and oriented to person, place, and time. A cranial nerve deficit is present. No sensory deficit. Gait normal. GCS eye subscore is 4. GCS verbal subscore is 5. GCS motor subscore is 6. Able to hold up legs against gravity bilaterally. Equal strength in bilateral UE's. L brow does not raise and L eye does not close completely. (+) L facial droop. Skin: Skin is warm and dry. Capillary refill takes less than 2 seconds. Psychiatric: She has a normal mood and affect. Her behavior is normal.   Nursing note and vitals reviewed. Note written by Brenda Abernathy, as dictated by Saima Alfaro MD 4:51 PM    MDM  Number of Diagnoses or Management Options  Bell's palsy: new and requires workup  Diagnosis management comments: The patient is resting comfortably and feels better, is alert, talkative, interactive and in no distress. The repeat examination is unremarkable and benign. The history, exam, diagnostic testing (if any) and the patient's current condition do not suggest arrhythmia, STEMI, seizure, meningitis, stroke, sepsis, subarachnoid hemorrhage, intracranial bleeding, encephalitis or other significant pathology that would warrant further testing, continued ED treatment, admission, neurological consultation, or other specialist evaluation at this point. The vital signs have been stable. The patient's condition is stable and appropriate for discharge. The patient will pursue further outpatient evaluation with the primary care physician or other designated or consulting physician as indicated in the discharge instructions. Procedures    The patient's results have been reviewed with them and/or available family.  Patient and/or family verbally conveyed their understanding and agreement of the patient's signs, symptoms, diagnosis, treatment and prognosis and additionally agree to follow up as recommended in the discharge instructions or to return to the Emergency Room should their condition change prior to their follow-up appointment. The patient/family verbally agrees with the care-plan and verbally conveys that all of their questions have been answered. The discharge instructions have also been provided to the patient and/or family with some educational information regarding the patient's diagnosis as well a list of reasons why the patient would want to return to the ER prior to their follow-up appointment, should their condition change. She has been given an Rx for prednisone.

## 2019-04-19 ENCOUNTER — OFFICE VISIT (OUTPATIENT)
Dept: PRIMARY CARE CLINIC | Age: 29
End: 2019-04-19

## 2019-04-19 VITALS
BODY MASS INDEX: 41.15 KG/M2 | SYSTOLIC BLOOD PRESSURE: 116 MMHG | OXYGEN SATURATION: 97 % | HEIGHT: 69 IN | DIASTOLIC BLOOD PRESSURE: 82 MMHG | TEMPERATURE: 98.3 F | WEIGHT: 277.8 LBS | HEART RATE: 68 BPM | RESPIRATION RATE: 16 BRPM

## 2019-04-19 DIAGNOSIS — G51.0 BELL'S PALSY: ICD-10-CM

## 2019-04-19 DIAGNOSIS — Z00.00 ANNUAL PHYSICAL EXAM: Primary | ICD-10-CM

## 2019-04-19 RX ORDER — PYRIDOXINE HCL (VITAMIN B6) 100 MG
100 TABLET ORAL DAILY
COMMUNITY
End: 2021-12-16

## 2019-04-19 RX ORDER — ETONOGESTREL AND ETHINYL ESTRADIOL .12; .015 MG/D; MG/D
INSERT, EXTENDED RELEASE VAGINAL
Refills: 4 | COMMUNITY
Start: 2019-04-07

## 2019-04-19 NOTE — PROGRESS NOTES
Chief Complaint Patient presents with [facility]  Complete Physical  
 
1. Have you been to the ER, urgent care clinic since your last visit? Hospitalized since your last visit? Yes 3/22/19 [facility] for facial droop 2. Have you seen or consulted any other health care providers outside of the [address] [place] [address] since your last visit? Include any pap smears or colon screening.  No

## 2019-04-19 NOTE — PROGRESS NOTES
Vin Escalante is a 25 y.o.  female and presents with Chief Complaint Patient presents with [facility] Establish Care  Complete Physical  
 Facial Droop Pt is here to establish care. Pt had Tucker palsy last month and was seen in the ER , [facility] . Pt was given prednisone. Pt is doing facial exercises. Diabetes runs in family. Pt did not have gestational DM. Pt used to work at [employer]. Occasional sharp pain in chest. 
Pt has remote history of asthma. Does not smoke. Wt is up and down. Pt has 2 month old baby. Currently not breast feeding. Past Medical History:  
Diagnosis Date  Bell's palsy   
 left side Past Surgical History:  
Procedure Laterality Date  HX  SECTION  2018  HX GYN Current Outpatient Medications Medication Sig  NUVARING 0.12-0.015 mg/24 hr vaginal ring  pyridoxine, vitamin B6, (VITAMIN B-6) 100 mg tablet Take 100 mg by mouth daily. No current facility-administered medications for this visit. Health Maintenance Topic Date Due  
 DTaP/Tdap/Td series (1 - Tdap) 2009  Influenza Age 5 to Adult  2019  PAP AKA CERVICAL CYTOLOGY  2021  Pneumococcal 0-64 years  Aged Out There is no immunization history on file for this patient. Patient's last menstrual period was 2019 (within days). Allergies and Intolerances:  
No Known Allergies Family History:  
History reviewed. No pertinent family history. Social History: She  reports that she has never smoked. She has never used smokeless tobacco.  She  reports that she drinks alcohol. Review of Systems:  
General: negative for - chills, fatigue, fever, weight change Psych: negative for - anxiety, depression, irritability or mood swings ENT: negative for - headaches, hearing change, nasal congestion, oral lesions, sneezing or sore throat Heme/ Lymph: negative for - bleeding problems, bruising, pallor or swollen lymph nodes Endo: negative for - hot flashes, polydipsia/polyuria or temperature intolerance Resp: negative for - cough, shortness of breath or wheezing CV: negative for - chest pain, edema or palpitations GI: negative for - abdominal pain, change in bowel habits, constipation, diarrhea or nausea/vomiting : negative for - dysuria, hematuria, incontinence, pelvic pain or vulvar/vaginal symptoms MSK: negative for - joint pain, joint swelling or muscle pain Neuro: negative for - confusion, headaches, seizures or weakness Derm: negative for - dry skin, hair changes, rash or skin lesion changes Physical:  
Vitals:  
Vitals:  
 04/19/19 1042 BP: 116/82 Pulse: 68 Resp: 16 Temp: 98.3 °F (36.8 °C) TempSrc: Oral  
SpO2: 97% Weight: 277 lb 12.8 oz (126 kg) Height: 5' 9\" (1.753 m) Exam:  
HEENT- atraumatic,normocephalic, awake, oriented, well nourished, mild left-sided facial asymmetry, unable to close the left eyelid  completely, Neck - supple,no enlarged lymph nodes, no JVD, mild thyromegaly Chest- CTA, no rhonchi, no crackles Heart- rrr, no murmurs / gallop/rub Abdomen- soft,BS+,NT, no hepatosplenomegaly Ext - no c/c/edema Neuro- no focal deficits. Power 5/5 all extremities Skin - warm,dry, no obvious rashes. Review of Data:  
LABS:  
No results found for: WBC, HGB, HCT, PLT, HGBEXT, HCTEXT, PLTEXT, HGBEXT, HCTEXT, PLTEXT No results found for: NA, K, CL, CO2, GLU, BUN, CREA No results found for: CHOL, CHOLX, CHLST, CHOLV, HDL, LDL, LDLC, DLDLP, TGLX, TRIGL, TRIGP No results found for: GPT Impression / Plan: ICD-10-CM ICD-9-CM 1. Annual physical exam Z00.00 V70.0 CBC WITH AUTOMATED DIFF  
   METABOLIC PANEL, COMPREHENSIVE  
   TSH 3RD GENERATION  
   LIPID PANEL 2. Bell's palsy G51.0 351.0 Advised patient to do regular facial exercises on the left side, also asked patient to chew gum by placing it in the left side of her mouth. Gum inflammation, advised patient to see a dentist. 
 
Mild thyromegalywe will order TSH. Explained to patient risk benefits of the medications. Advised patient to stop meds if having any side effects. Pt verbalized understanding of the instructions. I have discussed the diagnosis with the patient and the intended plan as seen in the above orders. The patient has received an after-visit summary and questions were answered concerning future plans. I have discussed medication side effects and warnings with the patient as well. I have reviewed the plan of care with the patient, accepted their input and they are in agreement with the treatment goals. Reviewed plan of care. Patient has provided input and agrees with goals.  
 
 
 
Marlen Vasquez MD

## 2019-04-20 LAB
ALBUMIN SERPL-MCNC: 4 G/DL (ref 3.5–5.5)
ALBUMIN/GLOB SERPL: 1.3 {RATIO} (ref 1.2–2.2)
ALP SERPL-CCNC: 140 IU/L (ref 39–117)
ALT SERPL-CCNC: 53 IU/L (ref 0–32)
AST SERPL-CCNC: 29 IU/L (ref 0–40)
BASOPHILS # BLD AUTO: 0 X10E3/UL (ref 0–0.2)
BASOPHILS NFR BLD AUTO: 1 %
BILIRUB SERPL-MCNC: 0.2 MG/DL (ref 0–1.2)
BUN SERPL-MCNC: 6 MG/DL (ref 6–20)
BUN/CREAT SERPL: 8 (ref 9–23)
CALCIUM SERPL-MCNC: 9.2 MG/DL (ref 8.7–10.2)
CHLORIDE SERPL-SCNC: 106 MMOL/L (ref 96–106)
CHOLEST SERPL-MCNC: 220 MG/DL (ref 100–199)
CO2 SERPL-SCNC: 19 MMOL/L (ref 20–29)
CREAT SERPL-MCNC: 0.8 MG/DL (ref 0.57–1)
EOSINOPHIL # BLD AUTO: 0.1 X10E3/UL (ref 0–0.4)
EOSINOPHIL NFR BLD AUTO: 3 %
ERYTHROCYTE [DISTWIDTH] IN BLOOD BY AUTOMATED COUNT: 15.4 % (ref 12.3–15.4)
GLOBULIN SER CALC-MCNC: 3.1 G/DL (ref 1.5–4.5)
GLUCOSE SERPL-MCNC: 92 MG/DL (ref 65–99)
HCT VFR BLD AUTO: 38.4 % (ref 34–46.6)
HDLC SERPL-MCNC: 50 MG/DL
HGB BLD-MCNC: 12.2 G/DL (ref 11.1–15.9)
IMM GRANULOCYTES # BLD AUTO: 0 X10E3/UL (ref 0–0.1)
IMM GRANULOCYTES NFR BLD AUTO: 0 %
LDLC SERPL CALC-MCNC: 152 MG/DL (ref 0–99)
LYMPHOCYTES # BLD AUTO: 1.7 X10E3/UL (ref 0.7–3.1)
LYMPHOCYTES NFR BLD AUTO: 39 %
MCH RBC QN AUTO: 26.5 PG (ref 26.6–33)
MCHC RBC AUTO-ENTMCNC: 31.8 G/DL (ref 31.5–35.7)
MCV RBC AUTO: 84 FL (ref 79–97)
MONOCYTES # BLD AUTO: 0.3 X10E3/UL (ref 0.1–0.9)
MONOCYTES NFR BLD AUTO: 6 %
NEUTROPHILS # BLD AUTO: 2.3 X10E3/UL (ref 1.4–7)
NEUTROPHILS NFR BLD AUTO: 51 %
PLATELET # BLD AUTO: 368 X10E3/UL (ref 150–379)
POTASSIUM SERPL-SCNC: 4.1 MMOL/L (ref 3.5–5.2)
PROT SERPL-MCNC: 7.1 G/DL (ref 6–8.5)
RBC # BLD AUTO: 4.6 X10E6/UL (ref 3.77–5.28)
SODIUM SERPL-SCNC: 141 MMOL/L (ref 134–144)
TRIGL SERPL-MCNC: 91 MG/DL (ref 0–149)
TSH SERPL DL<=0.005 MIU/L-ACNC: 1.8 UIU/ML (ref 0.45–4.5)
VLDLC SERPL CALC-MCNC: 18 MG/DL (ref 5–40)
WBC # BLD AUTO: 4.4 X10E3/UL (ref 3.4–10.8)

## 2019-04-22 ENCOUNTER — TELEPHONE (OUTPATIENT)
Dept: PRIMARY CARE CLINIC | Age: 29
End: 2019-04-22

## 2019-04-22 NOTE — TELEPHONE ENCOUNTER
----- Message from Jazzy Cage MD sent at 4/22/2019 11:20 AM EDT -----  Lab results reveal elevated cholesterol. Will advise diet , exercise, low fat diet.

## 2021-06-04 NOTE — ANESTHESIA POSTPROCEDURE EVALUATION
Procedure(s):  SECTION. Anesthesia Post Evaluation Multimodal analgesia: multimodal analgesia used between 6 hours prior to anesthesia start to PACU discharge Patient location during evaluation: bedside Patient participation: complete - patient participated Level of consciousness: awake and alert Pain management: adequate Airway patency: patent Anesthetic complications: no 
Cardiovascular status: acceptable Respiratory status: acceptable Hydration status: acceptable Post anesthesia nausea and vomiting:  none Visit Vitals /64 Pulse 77 Temp 36.9 °C (98.5 °F) Resp 16 Ht 5' 9\" (1.753 m) Wt 131.5 kg (290 lb) SpO2 97% Breastfeeding? Unknown BMI 42.83 kg/m² H Plasty Text: Given the location of the defect, shape of the defect and the proximity to free margins a H-plasty was deemed most appropriate for repair.  Using a sterile surgical marker, the appropriate advancement arms of the H-plasty were drawn incorporating the defect and placing the expected incisions within the relaxed skin tension lines where possible. The area thus outlined was incised deep to adipose tissue with a #15 scalpel blade. The skin margins were undermined to an appropriate distance in all directions utilizing iris scissors.  The opposing advancement arms were then advanced into place in opposite direction and anchored with interrupted buried subcutaneous sutures.

## 2021-12-16 ENCOUNTER — OFFICE VISIT (OUTPATIENT)
Dept: PRIMARY CARE CLINIC | Age: 31
End: 2021-12-16
Payer: MEDICARE

## 2021-12-16 VITALS
WEIGHT: 293 LBS | TEMPERATURE: 97.3 F | DIASTOLIC BLOOD PRESSURE: 70 MMHG | OXYGEN SATURATION: 99 % | SYSTOLIC BLOOD PRESSURE: 109 MMHG | HEART RATE: 84 BPM | RESPIRATION RATE: 17 BRPM | HEIGHT: 69 IN | BODY MASS INDEX: 43.4 KG/M2

## 2021-12-16 DIAGNOSIS — Z00.00 MEDICARE ANNUAL WELLNESS VISIT, INITIAL: Primary | ICD-10-CM

## 2021-12-16 DIAGNOSIS — Z11.59 NEED FOR HEPATITIS C SCREENING TEST: ICD-10-CM

## 2021-12-16 DIAGNOSIS — Z00.5 WILLING TO BE KIDNEY DONOR: ICD-10-CM

## 2021-12-16 DIAGNOSIS — Z79.3 LONG TERM (CURRENT) USE OF HORMONAL CONTRACEPTIVES: ICD-10-CM

## 2021-12-16 PROCEDURE — G8417 CALC BMI ABV UP PARAM F/U: HCPCS | Performed by: FAMILY MEDICINE

## 2021-12-16 PROCEDURE — G8510 SCR DEP NEG, NO PLAN REQD: HCPCS | Performed by: FAMILY MEDICINE

## 2021-12-16 PROCEDURE — G0438 PPPS, INITIAL VISIT: HCPCS | Performed by: FAMILY MEDICINE

## 2021-12-16 PROCEDURE — G8427 DOCREV CUR MEDS BY ELIG CLIN: HCPCS | Performed by: FAMILY MEDICINE

## 2021-12-16 PROCEDURE — 99213 OFFICE O/P EST LOW 20 MIN: CPT | Performed by: FAMILY MEDICINE

## 2021-12-16 NOTE — PROGRESS NOTES
Identified pt with two pt identifiers(name and ). Chief Complaint   Patient presents with   [facility] Favorite Establish Care     flu vaccine declined    Other     would like to donate a kidney to her 26 yo brother with kidney failure; she would like to know what the first steps would be to becoming a donor        3 most recent PHQ Screens 2021   Little interest or pleasure in doing things Not at all   Feeling down, depressed, irritable, or hopeless Not at all   Total Score PHQ 2 0        Vitals:    21 1110   BP: 109/70   Pulse: 84   Resp: 17   Temp: 97.3 °F (36.3 °C)   TempSrc: Temporal   SpO2: 99%   Weight: 307 lb (139.3 kg)   Height: 5' 9\" (1.753 m)   PainSc:   0 - No pain   LMP: 2021       Health Maintenance Due   Topic    Hepatitis C Screening     COVID-19 Vaccine (1)    Cervical cancer screen     Medicare Yearly Exam        1. Have you been to the ER, urgent care clinic since your last visit? Hospitalized since your last visit? Urgent Care for COVID test in 2021    2. Have you seen or consulted any other health care providers outside of the [address] [place] [address] since your last visit? Include any pap smears or colon screening.  No

## 2021-12-16 NOTE — PROGRESS NOTES
[facility] [address] [facility], [address] [facility]    Date of visit: 2021       This is an Initial Medicare Annual Wellness Visit (AWV), (Performed more than 12 months after effective date of Medicare Part B enrollment and 12 months after last preventive visit, Once in a lifetime)    I have reviewed the patient's medical history in detail and updated the computerized patient record. Hiren Garza is a 33 y.o. female   History obtained from: the patient. Concerns today   Pretty healthy individual.   States she has not had a checkup since 2019. Wants to donate her kidney to her brother who was diagnosed with kidney failure recently. Wants to know if she would be a match and what to do. History     Patient Active Problem List   Diagnosis Code    Normal labor O80, Z37.9    Abdominal pain during pregnancy in third trimester O26.893, R10.9    Pregnancy Z34.90     Past Medical History:   Diagnosis Date    Asthma     childhood    Bell's palsy     left side    Gestational hypertension     with daughter (10 years ago)      Past Surgical History:   Procedure Laterality Date    HX  SECTION  2018    HX GYN       No Known Allergies  Current Outpatient Medications   Medication Sig Dispense Refill    mv-mn/iron fum/FA/omega3,6,9#3 (WOMEN'S MULTI PO) Take  by mouth.       NUVARING 0.12-0.015 mg/24 hr vaginal ring   4     Family History   Problem Relation Age of Onset    Diabetes Mother     Diabetes Father      Social History     Tobacco Use    Smoking status: Never Smoker    Smokeless tobacco: Never Used   Substance Use Topics    Alcohol use: Yes     Comment: socially       Specialists/Care Team   Hiren Garza has established care with the following healthcare providers:  Patient Care Team:  Kenyon Bryan DO as PCP - General (Family Medicine)  Kenyon Bryan DO as PCP - REHABILITATION [facility] Provider    Health Risk Assessment Demographics   female  33 y.o. General Health Questions   Scanned into chart. Review of Systems (if indicated for problems addressed today)   Denies fever, chills, sore throat, cough. Physical Examination     Vitals:    12/16/21 1110   BP: 109/70   Pulse: 84   Resp: 17   Temp: 97.3 °F (36.3 °C)   TempSrc: Temporal   SpO2: 99%   Weight: 307 lb (139.3 kg)   Height: 5' 9\" (1.753 m)     Body mass index is 45.34 kg/m². No exam data present  Was the patient's timed Up & Go test unsteady or longer than 30 seconds? no    Evaluation of Cognitive Function   Mood/affect:  neutral  Orientation: Person, Place and Time  Appearance: age appropriate  Family member/caregiver input: not present    Additional exam if indicated for problems addressed today:  WNWD, NAD  RRR, no murmurs  CTA, no wheezes/ ronchi/ rales    Advice/Referrals/Counseling (as indicated)   Education and counseling provided for any problems identified above: routine blood work    Preventive Services     She goes to [employer] [facility] regularly for her birth control. Had PAP this year which was normal.   Has not had a COVID vaccine and does not plan on getting one at the moment. She has eaten today. No fam hx of colon or breast cancer. Discussed today Done Previously Not Needed      x Pneumococcal vaccines   x   Flu vaccine     x Hepatitis B vaccine (if at risk)     x Shingles vaccine    x  TDAP vaccine     x Mammogram    x  Pap smear     x Colorectal cancer screening     x Low-dose CT for lung cancer screening     x Bone density test     x Glaucoma screening   x   Cholesterol test   x   Diabetes screening test      x Diabetes self-management class     x Nutritionist referral for diabetes or renal disease     Discussion of Advance Directive   Discussed with Gurinder Bonds her ability to prepare and advance directive in the case that an injury or illness causes her to be unable to make health care decisions.    [facility] [place], [facility]. decision maker updated in chart.    Assessment/Plan   Z00.00    ICD-10-CM ICD-9-CM    1. Medicare annual wellness visit, initial  Z00.00 V70.0    2. Need for hepatitis C screening test  Z11.59 V73.89 HEPATITIS C AB      HEPATITIS C AB   3. BMI 40.0-44.9, adult (HCC)  Z68.41 V85.41 CBC W/O DIFF      METABOLIC PANEL, COMPREHENSIVE      HEMOGLOBIN A1C WITH EAG      TSH 3RD GENERATION      CBC W/O DIFF      METABOLIC PANEL, COMPREHENSIVE      HEMOGLOBIN A1C WITH EAG      TSH 3RD GENERATION   4.  Willing to be kidney donor  Z00.5 V70.8 REFERRAL TO NEPHROLOGY   5. Long term (current) use of hormonal contraceptives   Z79.3 V58.69 HEMOGLOBIN A1C WITH EAG      TSH 3RD GENERATION      HEMOGLOBIN A1C WITH EAG      TSH 3RD GENERATION       Orders Placed This Encounter    CBC W/O DIFF    METABOLIC PANEL, COMPREHENSIVE    HEMOGLOBIN A1C WITH EAG    TSH 3RD GENERATION    HEPATITIS C AB    [facility]    mv-mn/iron fum/FA/omega3,6,9#3 (WOMEN'S MULTI PO)           Azalea Sierra DO

## 2021-12-17 ENCOUNTER — TELEPHONE (OUTPATIENT)
Dept: PRIMARY CARE CLINIC | Age: 31
End: 2021-12-17

## 2021-12-17 LAB
ALBUMIN SERPL-MCNC: 4.1 G/DL (ref 3.8–4.8)
ALBUMIN/GLOB SERPL: 1.3 {RATIO} (ref 1.2–2.2)
ALP SERPL-CCNC: 140 IU/L (ref 44–121)
ALT SERPL-CCNC: 21 IU/L (ref 0–32)
AST SERPL-CCNC: 16 IU/L (ref 0–40)
BILIRUB SERPL-MCNC: <0.2 MG/DL (ref 0–1.2)
BUN SERPL-MCNC: 8 MG/DL (ref 6–20)
BUN/CREAT SERPL: 9 (ref 9–23)
CALCIUM SERPL-MCNC: 9.3 MG/DL (ref 8.7–10.2)
CHLORIDE SERPL-SCNC: 106 MMOL/L (ref 96–106)
CO2 SERPL-SCNC: 20 MMOL/L (ref 20–29)
CREAT SERPL-MCNC: 0.94 MG/DL (ref 0.57–1)
ERYTHROCYTE [DISTWIDTH] IN BLOOD BY AUTOMATED COUNT: 12.6 % (ref 11.7–15.4)
EST. AVERAGE GLUCOSE BLD GHB EST-MCNC: 126 MG/DL
GLOBULIN SER CALC-MCNC: 3.1 G/DL (ref 1.5–4.5)
GLUCOSE SERPL-MCNC: 83 MG/DL (ref 65–99)
HBA1C MFR BLD: 6 % (ref 4.8–5.6)
HCT VFR BLD AUTO: 40.6 % (ref 34–46.6)
HCV AB S/CO SERPL IA: <0.1 S/CO RATIO (ref 0–0.9)
HGB BLD-MCNC: 13.5 G/DL (ref 11.1–15.9)
MCH RBC QN AUTO: 28 PG (ref 26.6–33)
MCHC RBC AUTO-ENTMCNC: 33.3 G/DL (ref 31.5–35.7)
MCV RBC AUTO: 84 FL (ref 79–97)
PLATELET # BLD AUTO: 321 X10E3/UL (ref 150–450)
POTASSIUM SERPL-SCNC: 4.4 MMOL/L (ref 3.5–5.2)
PROT SERPL-MCNC: 7.2 G/DL (ref 6–8.5)
RBC # BLD AUTO: 4.82 X10E6/UL (ref 3.77–5.28)
SODIUM SERPL-SCNC: 142 MMOL/L (ref 134–144)
TSH SERPL DL<=0.005 MIU/L-ACNC: 1.55 UIU/ML (ref 0.45–4.5)
WBC # BLD AUTO: 4.8 X10E3/UL (ref 3.4–10.8)

## 2022-03-18 PROBLEM — Z34.90 PREGNANCY: Status: ACTIVE | Noted: 2018-12-30

## 2022-03-18 PROBLEM — Z37.9 NORMAL LABOR: Status: ACTIVE | Noted: 2017-12-26

## 2022-03-19 PROBLEM — R10.9 ABDOMINAL PAIN DURING PREGNANCY IN THIRD TRIMESTER: Status: ACTIVE | Noted: 2018-12-30

## 2022-03-19 PROBLEM — O26.893 ABDOMINAL PAIN DURING PREGNANCY IN THIRD TRIMESTER: Status: ACTIVE | Noted: 2018-12-30

## 2022-05-16 ENCOUNTER — TRANSCRIBE ORDER (OUTPATIENT)
Dept: MAMMOGRAPHY | Age: 32
End: 2022-05-16

## 2022-05-16 ENCOUNTER — HOSPITAL ENCOUNTER (OUTPATIENT)
Dept: MAMMOGRAPHY | Age: 32
Discharge: HOME | End: 2022-05-16
Attending: OBSTETRICS & GYNECOLOGY
Payer: MEDICARE

## 2022-05-16 DIAGNOSIS — Z12.31 VISIT FOR SCREENING MAMMOGRAM: ICD-10-CM

## 2022-05-16 DIAGNOSIS — Z12.31 VISIT FOR SCREENING MAMMOGRAM: Primary | ICD-10-CM

## 2022-05-16 PROCEDURE — 77063 BREAST TOMOSYNTHESIS BI: CPT

## 2022-05-25 ENCOUNTER — HOSPITAL ENCOUNTER (OUTPATIENT)
Dept: MAMMOGRAPHY | Age: 32
Discharge: HOME | End: 2022-05-25
Attending: OBSTETRICS & GYNECOLOGY
Payer: MEDICARE

## 2022-05-25 DIAGNOSIS — R92.8 ABNORMAL MAMMOGRAM: ICD-10-CM

## 2022-05-25 PROCEDURE — 76642 ULTRASOUND BREAST LIMITED: CPT

## 2022-12-01 ENCOUNTER — HOSPITAL ENCOUNTER (OUTPATIENT)
Dept: MAMMOGRAPHY | Age: 32
Discharge: HOME | End: 2022-12-01
Attending: OBSTETRICS & GYNECOLOGY
Payer: MEDICARE

## 2022-12-01 DIAGNOSIS — R92.8 ABNORMAL MAMMOGRAM OF LEFT BREAST: ICD-10-CM

## 2022-12-01 PROCEDURE — 76642 ULTRASOUND BREAST LIMITED: CPT

## 2023-05-16 NOTE — PROGRESS NOTES
1107: Patient arrived ambulatory from home with complaints of contractions starting at 0900 this morning. Patient is scheduled for a primary c/s tomorrow morning due a previous shoulder dystocia. Patient reports good fetal movement and declines any LOF or bleeding. 1134: Dr. Lieutenant Jackman at the bedside assessing the patient and [address]. SVE 2/60/-3 
 
1358: Dr Lieutenant Jackman at the bedside assessing the patient and [address]. SVE 5/90/-2. C/s called. 1459: To the OR 
 
1624: Patient back to room 1820: 30 minute called to MIU 
 
1907: TRANSFER - OUT REPORT: 
 
Verbal report given to Alycia Noyola RN (name) on [address] [facility] [address]  being transferred to MIU (unit) for routine progression of care Report consisted of patients Situation, Background, Assessment and  
Recommendations(SBAR). Information from the following report(s) SBAR, MAR and Recent Results was reviewed with the receiving nurse. Lines:  
Peripheral IV 12/30/18 Inferior;Left;Lower Arm (Active) Site Assessment Clean, dry, & intact 12/30/2018 12:10 PM  
Phlebitis Assessment 0 12/30/2018 12:10 PM  
Infiltration Assessment 0 12/30/2018 12:10 PM  
Dressing Status Clean, dry, & intact 12/30/2018 12:10 PM  
Dressing Type Tape;Transparent 12/30/2018 12:10 PM  
Hub Color/Line Status Pink;Capped; Infusing 12/30/2018 12:10 PM  
  
 
Opportunity for questions and clarification was provided. Patient transported with: 
 Registered Nurse Patient scheduled for surgery on: 5/24/23  Patient provided with verbal and written presurgical instructions  Verbalized understanding  with teach back on the following: Chlorhexidine wash    Lab specimen drawn at PST today: cbc, bmp, hga1c, type

## 2023-05-18 ENCOUNTER — TRANSCRIBE ORDERS (OUTPATIENT)
Facility: HOSPITAL | Age: 33
End: 2023-05-18

## 2023-05-18 DIAGNOSIS — N63.0 BREAST NODULE: ICD-10-CM

## 2023-05-18 DIAGNOSIS — R92.8 ABNORMAL MAMMOGRAM OF LEFT BREAST: Primary | ICD-10-CM

## 2023-06-22 ENCOUNTER — OFFICE VISIT (OUTPATIENT)
Dept: PRIMARY CARE CLINIC | Facility: CLINIC | Age: 33
End: 2023-06-22

## 2023-06-22 VITALS
OXYGEN SATURATION: 96 % | HEIGHT: 69 IN | WEIGHT: 292 LBS | BODY MASS INDEX: 43.25 KG/M2 | HEART RATE: 88 BPM | TEMPERATURE: 97.8 F | DIASTOLIC BLOOD PRESSURE: 71 MMHG | SYSTOLIC BLOOD PRESSURE: 105 MMHG | RESPIRATION RATE: 18 BRPM

## 2023-06-22 DIAGNOSIS — Z13.220 ENCOUNTER FOR LIPID SCREENING FOR CARDIOVASCULAR DISEASE: ICD-10-CM

## 2023-06-22 DIAGNOSIS — R73.03 PREDIABETES: ICD-10-CM

## 2023-06-22 DIAGNOSIS — K21.9 GASTROESOPHAGEAL REFLUX DISEASE, UNSPECIFIED WHETHER ESOPHAGITIS PRESENT: Primary | ICD-10-CM

## 2023-06-22 DIAGNOSIS — Z00.00 ENCOUNTER FOR WELL ADULT EXAM WITHOUT ABNORMAL FINDINGS: ICD-10-CM

## 2023-06-22 DIAGNOSIS — Z91.89 AT HIGH RISK FOR BREAST CANCER: ICD-10-CM

## 2023-06-22 DIAGNOSIS — E66.01 OBESITY, CLASS III, BMI 40-49.9 (MORBID OBESITY) (HCC): ICD-10-CM

## 2023-06-22 DIAGNOSIS — Z13.6 ENCOUNTER FOR LIPID SCREENING FOR CARDIOVASCULAR DISEASE: ICD-10-CM

## 2023-06-22 RX ORDER — FAMOTIDINE 20 MG/1
20 TABLET, FILM COATED ORAL NIGHTLY PRN
Qty: 30 TABLET | Refills: 3 | Status: SHIPPED | OUTPATIENT
Start: 2023-06-22

## 2023-06-22 SDOH — ECONOMIC STABILITY: INCOME INSECURITY: HOW HARD IS IT FOR YOU TO PAY FOR THE VERY BASICS LIKE FOOD, HOUSING, MEDICAL CARE, AND HEATING?: PATIENT DECLINED

## 2023-06-22 SDOH — ECONOMIC STABILITY: HOUSING INSECURITY
IN THE LAST 12 MONTHS, WAS THERE A TIME WHEN YOU DID NOT HAVE A STEADY PLACE TO SLEEP OR SLEPT IN A SHELTER (INCLUDING NOW)?: PATIENT REFUSED

## 2023-06-22 SDOH — ECONOMIC STABILITY: FOOD INSECURITY: WITHIN THE PAST 12 MONTHS, YOU WORRIED THAT YOUR FOOD WOULD RUN OUT BEFORE YOU GOT MONEY TO BUY MORE.: PATIENT DECLINED

## 2023-06-22 SDOH — ECONOMIC STABILITY: FOOD INSECURITY: WITHIN THE PAST 12 MONTHS, THE FOOD YOU BOUGHT JUST DIDN'T LAST AND YOU DIDN'T HAVE MONEY TO GET MORE.: PATIENT DECLINED

## 2023-06-22 ASSESSMENT — PATIENT HEALTH QUESTIONNAIRE - PHQ9
SUM OF ALL RESPONSES TO PHQ QUESTIONS 1-9: 0
2. FEELING DOWN, DEPRESSED OR HOPELESS: 0
1. LITTLE INTEREST OR PLEASURE IN DOING THINGS: 0
SUM OF ALL RESPONSES TO PHQ QUESTIONS 1-9: 0
SUM OF ALL RESPONSES TO PHQ9 QUESTIONS 1 & 2: 0
SUM OF ALL RESPONSES TO PHQ QUESTIONS 1-9: 0
SUM OF ALL RESPONSES TO PHQ QUESTIONS 1-9: 0
2. FEELING DOWN, DEPRESSED OR HOPELESS: 0
SUM OF ALL RESPONSES TO PHQ QUESTIONS 1-9: 0
SUM OF ALL RESPONSES TO PHQ9 QUESTIONS 1 & 2: 0
1. LITTLE INTEREST OR PLEASURE IN DOING THINGS: 0

## 2023-06-22 ASSESSMENT — ENCOUNTER SYMPTOMS
VOMITING: 0
ABDOMINAL PAIN: 0
BLOOD IN STOOL: 0
NAUSEA: 1

## 2023-06-22 ASSESSMENT — LIFESTYLE VARIABLES
HOW OFTEN DO YOU HAVE A DRINK CONTAINING ALCOHOL: NEVER
HOW MANY STANDARD DRINKS CONTAINING ALCOHOL DO YOU HAVE ON A TYPICAL DAY: PATIENT DOES NOT DRINK
HOW MANY STANDARD DRINKS CONTAINING ALCOHOL DO YOU HAVE ON A TYPICAL DAY: PATIENT DOES NOT DRINK
HOW OFTEN DO YOU HAVE A DRINK CONTAINING ALCOHOL: NEVER

## 2023-06-22 NOTE — PROGRESS NOTES
Well Adult Note  Name: Chelsea Rhodes Date: 2023   MRN: [id number] Sex: Female   Age: 27 y.o. Ethnicity: Non- / Non    : 1990 Race: Black / Diannia Duane is here for well adult exam.  History:    Since last week has had more reflux symptoms especially when she lays down. Drinks water which sometimes help. Occurs after anything she eats. Has not tried anything. No alcohol use. No abdominal pain. No blood in stool. Some nausea, no vomiting. Has not been using NSAIDs. Not a smoker.     - Sister has stage 4 breast cancer, diagnosed at 33 YO, had a mammogram 2 weeks ago. Plans to have ultrasound in 6 months. She is not sure if sister had genetic testing. Seeing GYN for this. - No fam hx of colon cancer.   - Had a PAP last year. - She is UTD on COVID vaccines  - Fasting for labs today    Review of Systems   Constitutional:  Negative for unexpected weight change. Gastrointestinal:  Positive for nausea. Negative for abdominal pain, blood in stool and vomiting. No Known Allergies      Prior to Visit Medications    Medication Sig Taking? Authorizing Provider   famotidine (PEPCID) 20 MG tablet Take 1 tablet by mouth nightly as needed (acid reflux) Yes Roel Mantle, DO   etonogestrel-ethinyl estradiol (NUVARING) 0.12-0.015 MG/24HR vaginal ring ceived the following from [facility]: Outside name: Cindy Grow 0.12-0.015 mg/24 hr vaginal ring Yes Ar Automatic Reconciliation         Past Medical History:   Diagnosis Date    Asthma     childhood    Bell's palsy     left side    Gestational hypertension     with daughter (10 years ago)       Past Surgical History:   Procedure Laterality Date    GYN           Family History   Problem Relation Age of Onset    Diabetes Father     Diabetes Mother     Breast Cancer Sister 27        Stage 4 w/ met to bones per pt.         Social History     Tobacco Use    Smoking status: Never    Smokeless tobacco:

## 2023-06-22 NOTE — PROGRESS NOTES
Chief Complaint   Patient presents with    Annual Exam     Patient states she has been having acid reflux - throat has burning sensation since last week - consistent        LMP 05/25/2023     1. Have you been to the ER, urgent care clinic since your last visit? Hospitalized since your last visit? No    2. Have you seen or consulted any other health care providers outside of the [address] [place] [address] since your last visit? Include any pap smears or colon screening. No      3. For patients aged 39-70: Has the patient had a colonoscopy / FIT/ Cologuard? no      If the patient is female:    4. For patients aged 41-77: Has the patient had a mammogram within the past 2 years? Yes      5. For patients aged 21-65: Has the patient had a pap smear?  Yes

## 2023-06-23 LAB
ALBUMIN SERPL-MCNC: 3.4 G/DL (ref 3.5–5)
ALBUMIN/GLOB SERPL: 0.9 (ref 1.1–2.2)
ALP SERPL-CCNC: 121 U/L (ref 45–117)
ALT SERPL-CCNC: 24 U/L (ref 12–78)
ANION GAP SERPL CALC-SCNC: 6 MMOL/L (ref 5–15)
AST SERPL-CCNC: 12 U/L (ref 15–37)
BILIRUB SERPL-MCNC: 0.3 MG/DL (ref 0.2–1)
BUN SERPL-MCNC: 8 MG/DL (ref 6–20)
BUN/CREAT SERPL: 10 (ref 12–20)
CALCIUM SERPL-MCNC: 8.9 MG/DL (ref 8.5–10.1)
CHLORIDE SERPL-SCNC: 108 MMOL/L (ref 97–108)
CHOLEST SERPL-MCNC: 218 MG/DL
CO2 SERPL-SCNC: 26 MMOL/L (ref 21–32)
CREAT SERPL-MCNC: 0.82 MG/DL (ref 0.55–1.02)
ERYTHROCYTE [DISTWIDTH] IN BLOOD BY AUTOMATED COUNT: 13.6 % (ref 11.5–14.5)
EST. AVERAGE GLUCOSE BLD GHB EST-MCNC: 108 MG/DL
GLOBULIN SER CALC-MCNC: 3.8 G/DL (ref 2–4)
GLUCOSE SERPL-MCNC: 90 MG/DL (ref 65–100)
HBA1C MFR BLD: 5.4 % (ref 4–5.6)
HCT VFR BLD AUTO: 42.3 % (ref 35–47)
HDLC SERPL-MCNC: 46 MG/DL
HDLC SERPL: 4.7 (ref 0–5)
HGB BLD-MCNC: 13.4 G/DL (ref 11.5–16)
LDLC SERPL CALC-MCNC: 149.4 MG/DL (ref 0–100)
MCH RBC QN AUTO: 27.7 PG (ref 26–34)
MCHC RBC AUTO-ENTMCNC: 31.7 G/DL (ref 30–36.5)
MCV RBC AUTO: 87.4 FL (ref 80–99)
NRBC # BLD: 0 K/UL (ref 0–0.01)
NRBC BLD-RTO: 0 PER 100 WBC
PLATELET # BLD AUTO: 318 K/UL (ref 150–400)
PMV BLD AUTO: 12.1 FL (ref 8.9–12.9)
POTASSIUM SERPL-SCNC: 4.4 MMOL/L (ref 3.5–5.1)
PROT SERPL-MCNC: 7.2 G/DL (ref 6.4–8.2)
RBC # BLD AUTO: 4.84 M/UL (ref 3.8–5.2)
SODIUM SERPL-SCNC: 140 MMOL/L (ref 136–145)
TRIGL SERPL-MCNC: 113 MG/DL
VLDLC SERPL CALC-MCNC: 22.6 MG/DL
WBC # BLD AUTO: 4.2 K/UL (ref 3.6–11)

## 2023-07-05 NOTE — PROGRESS NOTES
testing. We discussed that JAMESON does not apply to life insurance or long-term disability insurance. The patient expressed they felt the benefits of testing outweighed downsides    Genetic Test Selection   We reviewed various lab options and panels. We discussed the benefits and limitations of a focused vs. broad (80 plus) gene panel test. Together, we selected a test and panel to fit the patient's medical and family history, patient preferences, and medical needs. She states her goal for testing is to find out as much information as is possible to guide her cancer screening and overall health management     Possible Results and Implications  We discussed various possible testing outcomes, including Positive, Negative, and VUS findings. We discussed implications of possible test results and common strategies for risk-reduction and risk management. We discussed that an individual's personal medical history and/or family history may place them at increased risk for certain cancers regardless of germline genetic test results. In this situation, health practitioners may recommend high-risk management or more frequent or sooner cancer screenings even when germline testing is negative. She understands that even if results are Negative, based on her family history she remains at elevated risk for breast cancer and may meet criteria for high-risk screening, including breast MRI. Additionally, we planned to have an updated discussion about cancer risks and personalized screening plan once her genetic test results are back. I explained that it would be helpful to inquire whether her sister underwent testing and was found to harbor a pathogenic variant, as this could help tease out whether a negative report for the patient is a true negative. We agreed to have more discussion post test about specific risks and recommendations for management.  I explained it would be important for her to continue close follow up with

## 2023-07-06 ENCOUNTER — OFFICE VISIT (OUTPATIENT)
Age: 33
End: 2023-07-06

## 2023-07-06 DIAGNOSIS — Z13.71 ENCOUNTER FOR NONPROCREATIVE GENETIC COUNSELING AND TESTING: ICD-10-CM

## 2023-07-06 DIAGNOSIS — Z71.83 ENCOUNTER FOR NONPROCREATIVE GENETIC COUNSELING AND TESTING: ICD-10-CM

## 2023-07-06 DIAGNOSIS — Z80.3 FAMILY HISTORY OF BREAST CANCER IN FIRST DEGREE RELATIVE: Primary | ICD-10-CM

## 2023-07-26 ENCOUNTER — TELEPHONE (OUTPATIENT)
Age: 33
End: 2023-07-26

## 2023-07-26 ENCOUNTER — CLINICAL DOCUMENTATION (OUTPATIENT)
Age: 33
End: 2023-07-26

## 2023-07-26 NOTE — TELEPHONE ENCOUNTER
LVM for patient following up on genetic testing.   Requested return call and provided my contact information

## 2023-07-26 NOTE — TELEPHONE ENCOUNTER
variants in the GALNT12 gene are disease-causing is limited. Implications of a Clinically Negative Finding  Although test results are clinically negative, there could be a pathogenic variant that explains your personal history or family history and we have not yet detected this variant. It is also possible there is a different gene that we have not yet tested that may be responsible for the cancer in your history or your family history. Your risk to develop cancer(s) may change over time. Share any new information about your family history with your doctors. Personal medical history and family history are used to guide health care management in the setting of a clinically negative genetic test result. In situations where there is a strong family history of cancer, you or your close family members may be recommended to undergo earlier or more frequent or specialized cancer screening. We discussed that individuals with a first degree relative with breast cancer are at higher risk for developing breast cancer. Your sister's breat cancer diagnosis likely increases your breast cancer risk. Your mother is undergoing a breast biopsy this coming Friday, July 28. Keep your doctors updated about any new family history of cancers. Please share information about family history with your doctors and talk with your OB/GYN or PCP specifically about how your family history could impact breast cancer screening  A Tyrer-Cuzick evaluation was performed over the phone today and lifetime risk settled around 19%, however I did not have and was unable to enter information in about mammographic density which if obtained could alter this result. Your affected sister is encouraged to undergo genetic testing. Genetic test results can inform cancer therapies in the metastatic breast cancer stetting. I encourage you to inquire whether your affected sister has had genetic testing.  If she has undergone genetic testing

## 2023-12-07 ENCOUNTER — TRANSCRIBE ORDERS (OUTPATIENT)
Facility: HOSPITAL | Age: 33
End: 2023-12-07

## 2023-12-07 ENCOUNTER — HOSPITAL ENCOUNTER (OUTPATIENT)
Facility: HOSPITAL | Age: 33
Discharge: HOME | End: 2023-12-07
Payer: MEDICARE

## 2023-12-07 DIAGNOSIS — R92.8 ABNORMAL MAMMOGRAM OF LEFT BREAST: Primary | ICD-10-CM

## 2023-12-07 DIAGNOSIS — N63.0 BREAST NODULE: Primary | ICD-10-CM

## 2023-12-07 DIAGNOSIS — N63.0 BREAST NODULE: ICD-10-CM

## 2023-12-07 DIAGNOSIS — R92.8 ABNORMAL MAMMOGRAM OF LEFT BREAST: ICD-10-CM

## 2023-12-07 PROCEDURE — 76642 ULTRASOUND BREAST LIMITED: CPT

## 2024-06-07 ENCOUNTER — HOSPITAL ENCOUNTER (OUTPATIENT)
Facility: HOSPITAL | Age: 34
End: 2024-06-07
Payer: MEDICARE

## 2024-06-07 VITALS — WEIGHT: 292 LBS | HEIGHT: 69 IN | BODY MASS INDEX: 43.25 KG/M2

## 2024-06-07 DIAGNOSIS — N63.0 BREAST NODULE: ICD-10-CM

## 2024-06-07 DIAGNOSIS — R92.8 ABNORMAL MAMMOGRAM OF LEFT BREAST: ICD-10-CM

## 2024-06-07 PROCEDURE — 77066 DX MAMMO INCL CAD BI: CPT

## 2024-08-22 ENCOUNTER — TELEPHONE (OUTPATIENT)
Dept: PRIMARY CARE CLINIC | Facility: CLINIC | Age: 34
End: 2024-08-22

## 2024-08-22 NOTE — TELEPHONE ENCOUNTER
----- Message from Bry MITCHELL sent at 8/21/2024  3:40 PM EDT -----  Regarding: ECC Message to Provider  ECC Message to Provider    Relationship to Patient: Self     Additional Information : Patient would like to know if does she need to fast before her upcoming appointment on 8/27/24 .   --------------------------------------------------------------------------------------------------------------------------    Call Back Information: OK to leave message on voicemail  Preferred Call Back Number: Phone [phone]

## 2024-08-22 NOTE — TELEPHONE ENCOUNTER
Called patient, verified , advised her that she would need to fast based on previous labs including the lipid panel for cholesterol, advised her nothing after midnite night before appt.

## 2024-08-24 SDOH — ECONOMIC STABILITY: FOOD INSECURITY: WITHIN THE PAST 12 MONTHS, THE FOOD YOU BOUGHT JUST DIDN'T LAST AND YOU DIDN'T HAVE MONEY TO GET MORE.: NEVER TRUE

## 2024-08-24 SDOH — ECONOMIC STABILITY: FOOD INSECURITY: WITHIN THE PAST 12 MONTHS, YOU WORRIED THAT YOUR FOOD WOULD RUN OUT BEFORE YOU GOT MONEY TO BUY MORE.: SOMETIMES TRUE

## 2024-08-24 SDOH — HEALTH STABILITY: PHYSICAL HEALTH: ON AVERAGE, HOW MANY MINUTES DO YOU ENGAGE IN EXERCISE AT THIS LEVEL?: 30 MIN

## 2024-08-24 SDOH — ECONOMIC STABILITY: INCOME INSECURITY: HOW HARD IS IT FOR YOU TO PAY FOR THE VERY BASICS LIKE FOOD, HOUSING, MEDICAL CARE, AND HEATING?: VERY HARD

## 2024-08-24 SDOH — HEALTH STABILITY: PHYSICAL HEALTH: ON AVERAGE, HOW MANY DAYS PER WEEK DO YOU ENGAGE IN MODERATE TO STRENUOUS EXERCISE (LIKE A BRISK WALK)?: 3 DAYS

## 2024-08-24 SDOH — ECONOMIC STABILITY: TRANSPORTATION INSECURITY
IN THE PAST 12 MONTHS, HAS LACK OF TRANSPORTATION KEPT YOU FROM MEETINGS, WORK, OR FROM GETTING THINGS NEEDED FOR DAILY LIVING?: YES

## 2024-08-24 ASSESSMENT — PATIENT HEALTH QUESTIONNAIRE - PHQ9
SUM OF ALL RESPONSES TO PHQ QUESTIONS 1-9: 11
SUM OF ALL RESPONSES TO PHQ QUESTIONS 1-9: 11
9. THOUGHTS THAT YOU WOULD BE BETTER OFF DEAD, OR OF HURTING YOURSELF: NOT AT ALL
3. TROUBLE FALLING OR STAYING ASLEEP: MORE THAN HALF THE DAYS
5. POOR APPETITE OR OVEREATING: NEARLY EVERY DAY
SUM OF ALL RESPONSES TO PHQ9 QUESTIONS 1 & 2: 3
SUM OF ALL RESPONSES TO PHQ QUESTIONS 1-9: 11
10. IF YOU CHECKED OFF ANY PROBLEMS, HOW DIFFICULT HAVE THESE PROBLEMS MADE IT FOR YOU TO DO YOUR WORK, TAKE CARE OF THINGS AT HOME, OR GET ALONG WITH OTHER PEOPLE: NOT DIFFICULT AT ALL
7. TROUBLE CONCENTRATING ON THINGS, SUCH AS READING THE NEWSPAPER OR WATCHING TELEVISION: NOT AT ALL
4. FEELING TIRED OR HAVING LITTLE ENERGY: SEVERAL DAYS
8. MOVING OR SPEAKING SO SLOWLY THAT OTHER PEOPLE COULD HAVE NOTICED. OR THE OPPOSITE, BEING SO FIGETY OR RESTLESS THAT YOU HAVE BEEN MOVING AROUND A LOT MORE THAN USUAL: NOT AT ALL
6. FEELING BAD ABOUT YOURSELF - OR THAT YOU ARE A FAILURE OR HAVE LET YOURSELF OR YOUR FAMILY DOWN: MORE THAN HALF THE DAYS
1. LITTLE INTEREST OR PLEASURE IN DOING THINGS: SEVERAL DAYS
SUM OF ALL RESPONSES TO PHQ QUESTIONS 1-9: 11
2. FEELING DOWN, DEPRESSED OR HOPELESS: MORE THAN HALF THE DAYS

## 2024-08-24 ASSESSMENT — LIFESTYLE VARIABLES
HOW OFTEN DO YOU HAVE A DRINK CONTAINING ALCOHOL: NEVER
HOW OFTEN DO YOU HAVE SIX OR MORE DRINKS ON ONE OCCASION: 1
HOW OFTEN DO YOU HAVE A DRINK CONTAINING ALCOHOL: 1
HOW MANY STANDARD DRINKS CONTAINING ALCOHOL DO YOU HAVE ON A TYPICAL DAY: PATIENT DOES NOT DRINK
HOW MANY STANDARD DRINKS CONTAINING ALCOHOL DO YOU HAVE ON A TYPICAL DAY: 0

## 2024-08-27 ENCOUNTER — OFFICE VISIT (OUTPATIENT)
Dept: PRIMARY CARE CLINIC | Facility: CLINIC | Age: 34
End: 2024-08-27
Payer: MEDICARE

## 2024-08-27 VITALS
BODY MASS INDEX: 43.4 KG/M2 | SYSTOLIC BLOOD PRESSURE: 120 MMHG | DIASTOLIC BLOOD PRESSURE: 80 MMHG | HEART RATE: 68 BPM | OXYGEN SATURATION: 99 % | RESPIRATION RATE: 18 BRPM | HEIGHT: 69 IN | TEMPERATURE: 97.8 F | WEIGHT: 293 LBS

## 2024-08-27 DIAGNOSIS — Z01.84 IMMUNITY STATUS TESTING: ICD-10-CM

## 2024-08-27 DIAGNOSIS — E66.01 OBESITY, MORBID, BMI 40.0-49.9 (HCC): Primary | ICD-10-CM

## 2024-08-27 DIAGNOSIS — Z00.00 MEDICARE ANNUAL WELLNESS VISIT, SUBSEQUENT: ICD-10-CM

## 2024-08-27 DIAGNOSIS — R73.03 PREDIABETES: ICD-10-CM

## 2024-08-27 DIAGNOSIS — E78.5 HYPERLIPIDEMIA, UNSPECIFIED HYPERLIPIDEMIA TYPE: ICD-10-CM

## 2024-08-27 DIAGNOSIS — Z71.3 WEIGHT LOSS COUNSELING, ENCOUNTER FOR: ICD-10-CM

## 2024-08-27 LAB
ALBUMIN SERPL-MCNC: 3.4 G/DL (ref 3.5–5)
ALBUMIN/GLOB SERPL: 0.8 (ref 1.1–2.2)
ALP SERPL-CCNC: 124 U/L (ref 45–117)
ALT SERPL-CCNC: 18 U/L (ref 12–78)
ANION GAP SERPL CALC-SCNC: 4 MMOL/L (ref 5–15)
AST SERPL-CCNC: 15 U/L (ref 15–37)
BILIRUB SERPL-MCNC: 0.3 MG/DL (ref 0.2–1)
BUN SERPL-MCNC: 8 MG/DL (ref 6–20)
BUN/CREAT SERPL: 9 (ref 12–20)
CALCIUM SERPL-MCNC: 9 MG/DL (ref 8.5–10.1)
CHLORIDE SERPL-SCNC: 108 MMOL/L (ref 97–108)
CHOLEST SERPL-MCNC: 215 MG/DL
CO2 SERPL-SCNC: 26 MMOL/L (ref 21–32)
CREAT SERPL-MCNC: 0.94 MG/DL (ref 0.55–1.02)
ERYTHROCYTE [DISTWIDTH] IN BLOOD BY AUTOMATED COUNT: 13.7 % (ref 11.5–14.5)
EST. AVERAGE GLUCOSE BLD GHB EST-MCNC: 111 MG/DL
GLOBULIN SER CALC-MCNC: 4.2 G/DL (ref 2–4)
GLUCOSE SERPL-MCNC: 92 MG/DL (ref 65–100)
HBA1C MFR BLD: 5.5 % (ref 4–5.6)
HCT VFR BLD AUTO: 40 % (ref 35–47)
HDLC SERPL-MCNC: 54 MG/DL
HDLC SERPL: 4 (ref 0–5)
HGB BLD-MCNC: 12.9 G/DL (ref 11.5–16)
LDLC SERPL CALC-MCNC: 143.4 MG/DL (ref 0–100)
MCH RBC QN AUTO: 28 PG (ref 26–34)
MCHC RBC AUTO-ENTMCNC: 32.3 G/DL (ref 30–36.5)
MCV RBC AUTO: 86.8 FL (ref 80–99)
NRBC # BLD: 0 K/UL (ref 0–0.01)
NRBC BLD-RTO: 0 PER 100 WBC
PLATELET # BLD AUTO: 262 K/UL (ref 150–400)
PMV BLD AUTO: 12.5 FL (ref 8.9–12.9)
POTASSIUM SERPL-SCNC: 4.3 MMOL/L (ref 3.5–5.1)
PROT SERPL-MCNC: 7.6 G/DL (ref 6.4–8.2)
RBC # BLD AUTO: 4.61 M/UL (ref 3.8–5.2)
SODIUM SERPL-SCNC: 138 MMOL/L (ref 136–145)
TRIGL SERPL-MCNC: 88 MG/DL
VLDLC SERPL CALC-MCNC: 17.6 MG/DL
WBC # BLD AUTO: 4 K/UL (ref 3.6–11)

## 2024-08-27 PROCEDURE — G0439 PPPS, SUBSEQ VISIT: HCPCS | Performed by: FAMILY MEDICINE

## 2024-08-27 PROCEDURE — G0447 BEHAVIOR COUNSEL OBESITY 15M: HCPCS | Performed by: FAMILY MEDICINE

## 2024-08-27 NOTE — PROGRESS NOTES
Health Decision Maker has been checked with the patient   Primary Decision Maker: Sadia Leon - Karla - [phone]    Secondary Decision Maker: Bee Hall - Parent - [phone]     AI form WAS signed    No chief complaint on file.      \"Have you been to the ER, urgent care clinic since your last visit?  Hospitalized since your last visit?\"    NO    “Have you seen or consulted any other health care providers outside of [facility] since your last visit?”    NO      There were no vitals filed for this visit.   Depression: At risk (8/24/2024)    PHQ-2     PHQ-2 Score: 11       “Have you had a pap smear?”    YES - Where: [facility] - [facility] DR LOPEZ Nurse/CMA to request most recent records if not in the chart    No cervical cancer screening on file             Click Here for Release of Records Request    Chart reviewed: immunizations are documented.   Immunization History   Administered Date(s) Administered    COVID-19, PFIZER PURPLE top, DILUTE for use, (age 12 y+), 30mcg/0.3mL 12/31/2021, 01/22/2022    MMR, PRIORIX, M-M-R II, (age 12m+), SC, 0.5mL 01/02/2019    TDaP, ADACEL (age 10y-64y), BOOSTRIX (age 10y+), IM, 0.5mL 11/08/2017

## 2024-08-27 NOTE — PROGRESS NOTES
in the evening for 1 week; and then 2 tablets twice daily., Disp-120 tablet, R-2Normal  -     [place] Behavior  obesity (8-15 min) []    Recommendations for Preventive Services Due: see orders and patient instructions/AVS.  Recommended screening schedule for the next 5-10 years is provided to the patient in written form: see Patient Instructions/AVS.     No follow-ups on file.     Subjective     Patient's complete Health Risk Assessment and screening values have been reviewed and are found in Flowsheets. The following problems were reviewed today and where indicated follow up appointments were made and/or referrals ordered.    - Followed by Dr. Wright for PAP smears, UTD  - Has been doing breast cancer screening, sister passed from breast cancer last year   - No fam hx of colonc ancer  - Flu/ COVID vaccine discussed     Positive Risk Factor Screenings with Interventions:        Depression:  PHQ-2 Score: 3  PHQ-9 Total Score: 11  Total Score Interpretation: 10-14 = moderate depression  Interventions:  States her mood is borderline. Has days where she can get through things. Feels it is normal for a single mom with 3 kids. States Dad is not involved and financially hard. She is not interested in counseling or support services. Denies SI/ HI.              Poor Eating Habits/Diet:  Do you eat balanced/healthy meals regularly?: (!) No  Interventions:  States she feels like she knows what she needs to do but hard to implement. She is interested in weight loss meds.     Abnormal BMI (obese):  Body mass index is 45.22 kg/m². (!) Abnormal  Interventions:  Discussed healthy lifestyle changes.     Obesity Counseling: Patient was asked about her current diet and exercise habits, and personalized advice was provided regarding recommended lifestyle changes. Patient's comorbid health conditions associated with elevated BMI were discussed, as well as the likely benefits of weight loss. Based upon patient's motivation to  Reconciliation, Ar       CareTeam (Including outside providers/suppliers regularly involved in providing care):   Patient Care Team:  Kayleigh Mitchell DO as PCP - General  Kayleigh Mitchell DO as PCP - Empaneled Provider      Reviewed and updated this visit:  Tobacco  Allergies  Meds  Med Hx  Surg Hx  Soc Hx  Fam Hx

## 2024-08-27 NOTE — PATIENT INSTRUCTIONS
protect your heart. It is never too late to quit. Try to avoid secondhand smoke too.     Stay at a weight that's healthy for you. Talk to your doctor if you need help losing weight.     Try to get 7 to 9 hours of sleep each night.     Limit alcohol to 2 drinks a day for men and 1 drink a day for women. Too much alcohol can cause health problems.     Manage other health problems such as diabetes, high blood pressure, and high cholesterol. If you think you may have a problem with alcohol or drug use, talk to your doctor.   Medicines    Take your medicines exactly as prescribed. Call your doctor if you think you are having a problem with your medicine.     If your doctor recommends aspirin, take the amount directed each day. Make sure you take aspirin and not another kind of pain reliever, such as acetaminophen (Tylenol).   When should you call for help?   Call 911 if you have symptoms of a heart attack. These may include:    Chest pain or pressure, or a strange feeling in the chest.     Sweating.     Shortness of breath.     Pain, pressure, or a strange feeling in the back, neck, jaw, or upper belly or in one or both shoulders or arms.     Lightheadedness or sudden weakness.     A fast or irregular heartbeat.   After you call 911, the  may tell you to chew 1 adult-strength or 2 to 4 low-dose aspirin. Wait for an ambulance. Do not try to drive yourself.  Watch closely for changes in your health, and be sure to contact your doctor if you have any problems.  Where can you learn more?  Go to https://www.[employer].net/patientEd and enter F075 to learn more about \"A Healthy Heart: Care Instructions.\"  Current as of: June 24, 2023  Content Version: 14.1  © 9269-6089 [employer].   Care instructions adapted under license by [employer]. If you have questions about a medical condition or this instruction, always ask your healthcare professional. [employer] disclaims any warranty or  challenge to lose weight. But your doctor can help you make a weight-loss plan that meets your needs.  You don't have to make a lot of big changes at once. A better idea might be to focus on small changes and stick with them. When those changes become habit, you can add a few more changes.  Some people find it helpful to take an exercise or nutrition class. If you have questions, ask your doctor about seeing a registered dietitian or an exercise specialist. You might also think about joining a weight-loss support group.  If you're not ready to make changes right now, try to pick a date in the future. Then make an appointment with your doctor to talk about when and how you'll get started with a plan.  Follow-up care is a key part of your treatment and safety. Be sure to make and go to all appointments, and call your doctor if you are having problems. It's also a good idea to know your test results and keep a list of the medicines you take.  How can you care for yourself at home?  Set realistic goals. Many people expect to lose much more weight than is likely. A weight loss of 5% to 10% of your body weight may be enough to improve your health.  Get family and friends involved to provide support. Talk to them about why you are trying to lose weight, and ask them to help. They can help by participating in exercise and having meals with you, even if they may be eating something different.  Find what works best for you. If you do not have time or do not like to cook, a program that offers meal replacement bars or shakes may be better for you. Or if you like to prepare meals, finding a plan that includes daily menus and recipes may be best.  Ask your doctor about other health professionals who can help you achieve your weight loss goals.  A dietitian can help you make healthy changes in your diet.  An exercise specialist or  can help you develop a safe and effective exercise program.  A counselor or

## 2024-08-28 LAB — VZV IGG SER IA-ACNC: 1090 INDEX

## 2024-09-03 DIAGNOSIS — E66.01 OBESITY, MORBID, BMI 40.0-49.9 (HCC): ICD-10-CM

## 2024-09-03 DIAGNOSIS — Z71.3 WEIGHT LOSS COUNSELING, ENCOUNTER FOR: ICD-10-CM

## 2024-09-27 ENCOUNTER — TELEPHONE (OUTPATIENT)
Dept: PRIMARY CARE CLINIC | Facility: CLINIC | Age: 34
End: 2024-09-27

## 2024-10-14 ENCOUNTER — TELEPHONE (OUTPATIENT)
Dept: PRIMARY CARE CLINIC | Facility: CLINIC | Age: 34
End: 2024-10-14

## 2024-11-01 ENCOUNTER — TELEPHONE (OUTPATIENT)
Dept: PRIMARY CARE CLINIC | Facility: CLINIC | Age: 34
End: 2024-11-01

## 2024-11-01 NOTE — TELEPHONE ENCOUNTER
Called patient back. Patient stated she woke up in the middle of the night with a sore throat, this morning she took her temperature and it was 102. I advised patient that Dr. Mitchell is not in the office this afternoon, and we do not have any availability. I recommended patient seek care/evaluation at urgent care today. patient verbalized understanding and thanked me for the phone call.

## 2024-11-08 ENCOUNTER — OFFICE VISIT (OUTPATIENT)
Dept: PRIMARY CARE CLINIC | Facility: CLINIC | Age: 34
End: 2024-11-08

## 2024-11-08 VITALS
DIASTOLIC BLOOD PRESSURE: 85 MMHG | RESPIRATION RATE: 17 BRPM | HEART RATE: 105 BPM | BODY MASS INDEX: 42.65 KG/M2 | SYSTOLIC BLOOD PRESSURE: 117 MMHG | HEIGHT: 69 IN | WEIGHT: 288 LBS | OXYGEN SATURATION: 96 %

## 2024-11-08 DIAGNOSIS — E66.01 OBESITY, MORBID, BMI 40.0-49.9: ICD-10-CM

## 2024-11-08 DIAGNOSIS — Z71.3 WEIGHT LOSS COUNSELING, ENCOUNTER FOR: ICD-10-CM

## 2024-11-08 NOTE — PROGRESS NOTES
Health Decision Maker has been checked with the patient     Primary Decision Maker: Sadia Leon - Friend - [phone]    Secondary Decision Maker: Bee Hall - Parent - [phone]       AI form was signed    Chief Complaint   Patient presents with    Weight Management       \"Have you been to the ER, urgent care clinic since your last visit?  Hospitalized since your last visit?\"    NO    “Have you seen or consulted any other health care providers outside of [facility] since your last visit?”    NO      Vitals:    11/08/24 1222   Weight: 130.6 kg (288 lb)      Depression: At risk (8/24/2024)    PHQ-2     PHQ-2 Score: 11       “Have you had a pap smear?”    no    No cervical cancer screening on file             Click Here for Release of Records Request        Chart reviewed: immunizations are documented.   Immunization History   Administered Date(s) Administered    COVID-19, PFIZER PURPLE top, DILUTE for use, (age 12 y+), 30mcg/0.3mL 12/31/2021, 01/22/2022    MMR, PRIORIX, M-M-R II, (age 12m+), SC, 0.5mL 01/02/2019    TDaP, ADACEL (age 10y-64y), BOOSTRIX (age 10y+), IM, 0.5mL 11/08/2017      
instead of water. She has made lifestyle changes, including portion control and dietary adjustments. She was advised to continue her current regimen of Contrave, with a gradual reduction in dosage when she decides to discontinue. The tapering plan includes taking 2 tablets in the morning and 1 at night for a week, followed by 1 tablet in the morning and 1 at night for another week, and finally 1 tablet in the morning for a week before discontinuation. She was informed that abrupt discontinuation could lead to withdrawal symptoms. If she has trouble tapering she should reach out to us for further instruction.     Follow-up  Return in 3 months for follow up.       I have discussed the diagnosis with the patient and the intended plan as seen in the above orders. The patient has received an after-visit summary and questions were answered concerning future plans.  I have discussed medication side effects and warnings with the patient as well.    The patient (or guardian, if applicable) and other individuals in attendance with the patient were advised that Artificial Intelligence will be utilized during this visit to record and process the conversation to generate a clinical note. The patient (or guardian, if applicable) and other individuals in attendance at the appointment consented to the use of AI, including the recording.       Korin Mitchell DO

## 2024-12-16 DIAGNOSIS — E66.01 OBESITY, MORBID, BMI 40.0-49.9: ICD-10-CM

## 2024-12-16 DIAGNOSIS — Z71.3 WEIGHT LOSS COUNSELING, ENCOUNTER FOR: ICD-10-CM

## 2024-12-19 RX ORDER — NALTREXONE HYDROCHLORIDE AND BUPROPION HYDROCHLORIDE 8; 90 MG/1; MG/1
TABLET, EXTENDED RELEASE ORAL
Qty: 120 TABLET | Refills: 0 | OUTPATIENT
Start: 2024-12-19

## (undated) DEVICE — DRAPE FLD WRM W44XL66IN C6L FOR INTRATEMP SYS THERMABASIN

## (undated) DEVICE — LIGHT HANDLE: Brand: DEVON

## (undated) DEVICE — SPONGE: LAP 18X18 W  200/CS: Brand: MEDICAL ACTION INDUSTRIES

## (undated) DEVICE — SUTURE VCRL SZ 0 L36IN ABSRB VLT L40MM CT 1/2 CIR J358H

## (undated) DEVICE — ROYAL SILK SURGICAL GOWN, XXL: Brand: CONVERTORS

## (undated) DEVICE — 3000CC GUARDIAN II: Brand: GUARDIAN

## (undated) DEVICE — SOLIDIFIER MEDC 1200ML -- CONVERT TO 356117

## (undated) DEVICE — POOLE SUCTION INSTRUMENT WITH REMOVABLE SHEATH: Brand: POOLE

## (undated) DEVICE — 3M™ TEGADERM™ TRANSPARENT FILM DRESSING FRAME STYLE, 1628, 6 IN X 8 IN (15 CM X 20 CM), 10/CT 8CT/CASE: Brand: 3M™ TEGADERM™

## (undated) DEVICE — SUTURE MCRYL SZ 0 L36IN ABSRB UD L36MM CT-1 1/2 CIR Y946H

## (undated) DEVICE — STERILE POLYISOPRENE POWDER-FREE SURGICAL GLOVES: Brand: PROTEXIS

## (undated) DEVICE — DEVON™ KNEE AND BODY STRAP 60" X 3" (1.5 M X 7.6 CM): Brand: DEVON

## (undated) DEVICE — PACK PROCEDURE SURG C SECT KT SMH

## (undated) DEVICE — ROYALSILK SURGICAL GOWN, L: Brand: CONVERTORS

## (undated) DEVICE — AGENT HEMSTAT 3GM PURIFIED PLNT STARCH PWD ABSRB ARISTA AH

## (undated) DEVICE — (D)PREP SKN CHLRAPRP APPL 26ML -- CONVERT TO ITEM 371833

## (undated) DEVICE — SUTURE MCRYL SZ 4-0 L27IN ABSRB UD L24MM PS-1 3/8 CIR PRIM Y935H

## (undated) DEVICE — SOLUTION IRRIG 1000ML H2O STRL BLT

## (undated) DEVICE — SUTURE VCRL SZ 2-0 L36IN ABSRB VLT L36MM CT-1 1/2 CIR J345H

## (undated) DEVICE — CATH FOLEY 16F LUBRI-SIL IC --

## (undated) DEVICE — KENDALL SCD EXPRESS SLEEVES, KNEE LENGTH, MEDIUM: Brand: KENDALL SCD

## (undated) DEVICE — COVERALL PREM SMS 2XL KNIT --

## (undated) DEVICE — MEDI-VAC NON-CONDUCTIVE SUCTION TUBING: Brand: CARDINAL HEALTH

## (undated) DEVICE — SOLUTION IV 1000ML 0.9% SOD CHL

## (undated) DEVICE — REM POLYHESIVE ADULT PATIENT RETURN ELECTRODE: Brand: VALLEYLAB

## (undated) DEVICE — STERILE POLYISOPRENE POWDER-FREE SURGICAL GLOVES WITH EMOLLIENT COATING: Brand: PROTEXIS